# Patient Record
Sex: FEMALE | Race: BLACK OR AFRICAN AMERICAN | NOT HISPANIC OR LATINO | URBAN - METROPOLITAN AREA
[De-identification: names, ages, dates, MRNs, and addresses within clinical notes are randomized per-mention and may not be internally consistent; named-entity substitution may affect disease eponyms.]

---

## 2019-12-26 ENCOUNTER — INPATIENT (INPATIENT)
Facility: HOSPITAL | Age: 64
LOS: 4 days | Discharge: HOME | DRG: 684 | End: 2019-12-31
Attending: INTERNAL MEDICINE | Admitting: INTERNAL MEDICINE
Payer: MEDICARE

## 2019-12-26 VITALS
WEIGHT: 199.96 LBS | HEART RATE: 79 BPM | TEMPERATURE: 98 F | HEIGHT: 68 IN | RESPIRATION RATE: 18 BRPM | DIASTOLIC BLOOD PRESSURE: 76 MMHG | OXYGEN SATURATION: 98 % | SYSTOLIC BLOOD PRESSURE: 109 MMHG

## 2019-12-26 RX ORDER — KETOROLAC TROMETHAMINE 30 MG/ML
30 SYRINGE (ML) INJECTION ONCE
Refills: 0 | Status: DISCONTINUED | OUTPATIENT
Start: 2019-12-26 | End: 2019-12-26

## 2019-12-26 RX ORDER — SODIUM CHLORIDE 9 MG/ML
1000 INJECTION INTRAMUSCULAR; INTRAVENOUS; SUBCUTANEOUS ONCE
Refills: 0 | Status: COMPLETED | OUTPATIENT
Start: 2019-12-26 | End: 2019-12-26

## 2019-12-26 NOTE — ED ADULT TRIAGE NOTE - CHIEF COMPLAINT QUOTE
biba for fall at 5pm unable to get up with right ankle/foot swelling, pt denies hitting head, as per pt from bed went to the bathroom and while pt was trying to get up right leg just gave out

## 2019-12-27 DIAGNOSIS — E78.5 HYPERLIPIDEMIA, UNSPECIFIED: ICD-10-CM

## 2019-12-27 DIAGNOSIS — N17.9 ACUTE KIDNEY FAILURE, UNSPECIFIED: ICD-10-CM

## 2019-12-27 DIAGNOSIS — Z71.89 OTHER SPECIFIED COUNSELING: ICD-10-CM

## 2019-12-27 DIAGNOSIS — E11.9 TYPE 2 DIABETES MELLITUS WITHOUT COMPLICATIONS: ICD-10-CM

## 2019-12-27 DIAGNOSIS — I10 ESSENTIAL (PRIMARY) HYPERTENSION: ICD-10-CM

## 2019-12-27 DIAGNOSIS — E87.6 HYPOKALEMIA: ICD-10-CM
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
## 2019-12-27 DIAGNOSIS — F32.9 MAJOR DEPRESSIVE DISORDER, SINGLE EPISODE, UNSPECIFIED: ICD-10-CM

## 2019-12-27 DIAGNOSIS — Z29.9 ENCOUNTER FOR PROPHYLACTIC MEASURES, UNSPECIFIED: ICD-10-CM

## 2019-12-27 LAB
24R-OH-CALCIDIOL SERPL-MCNC: 26.1 NG/ML — LOW (ref 30–80)
ALBUMIN SERPL ELPH-MCNC: 3.2 G/DL — LOW (ref 3.5–5)
ALBUMIN SERPL ELPH-MCNC: 3.2 G/DL — LOW (ref 3.5–5)
ALP SERPL-CCNC: 84 U/L — SIGNIFICANT CHANGE UP (ref 40–120)
ALP SERPL-CCNC: 88 U/L — SIGNIFICANT CHANGE UP (ref 40–120)
ALT FLD-CCNC: 14 U/L DA — SIGNIFICANT CHANGE UP (ref 10–60)
ALT FLD-CCNC: 16 U/L DA — SIGNIFICANT CHANGE UP (ref 10–60)
ANION GAP SERPL CALC-SCNC: 8 MMOL/L — SIGNIFICANT CHANGE UP (ref 5–17)
ANION GAP SERPL CALC-SCNC: 9 MMOL/L — SIGNIFICANT CHANGE UP (ref 5–17)
ANION GAP SERPL CALC-SCNC: 9 MMOL/L — SIGNIFICANT CHANGE UP (ref 5–17)
APPEARANCE UR: ABNORMAL
APTT BLD: 34.7 SEC — SIGNIFICANT CHANGE UP (ref 27.5–36.3)
AST SERPL-CCNC: 12 U/L — SIGNIFICANT CHANGE UP (ref 10–40)
AST SERPL-CCNC: 15 U/L — SIGNIFICANT CHANGE UP (ref 10–40)
BASOPHILS # BLD AUTO: 0.03 K/UL — SIGNIFICANT CHANGE UP (ref 0–0.2)
BASOPHILS # BLD AUTO: 0.03 K/UL — SIGNIFICANT CHANGE UP (ref 0–0.2)
BASOPHILS NFR BLD AUTO: 0.3 % — SIGNIFICANT CHANGE UP (ref 0–2)
BASOPHILS NFR BLD AUTO: 0.4 % — SIGNIFICANT CHANGE UP (ref 0–2)
BILIRUB SERPL-MCNC: 0.4 MG/DL — SIGNIFICANT CHANGE UP (ref 0.2–1.2)
BILIRUB SERPL-MCNC: 0.7 MG/DL — SIGNIFICANT CHANGE UP (ref 0.2–1.2)
BILIRUB UR-MCNC: NEGATIVE — SIGNIFICANT CHANGE UP
BUN SERPL-MCNC: 49 MG/DL — HIGH (ref 7–18)
BUN SERPL-MCNC: 50 MG/DL — HIGH (ref 7–18)
BUN SERPL-MCNC: 53 MG/DL — HIGH (ref 7–18)
CALCIUM SERPL-MCNC: 8.6 MG/DL — SIGNIFICANT CHANGE UP (ref 8.4–10.5)
CALCIUM SERPL-MCNC: 8.7 MG/DL — SIGNIFICANT CHANGE UP (ref 8.4–10.5)
CALCIUM SERPL-MCNC: 9.1 MG/DL — SIGNIFICANT CHANGE UP (ref 8.4–10.5)
CHLORIDE SERPL-SCNC: 101 MMOL/L — SIGNIFICANT CHANGE UP (ref 96–108)
CHLORIDE SERPL-SCNC: 102 MMOL/L — SIGNIFICANT CHANGE UP (ref 96–108)
CHLORIDE SERPL-SCNC: 109 MMOL/L — HIGH (ref 96–108)
CHLORIDE UR-SCNC: 56 MMOL/L — SIGNIFICANT CHANGE UP (ref 55–125)
CHOLEST SERPL-MCNC: 197 MG/DL — SIGNIFICANT CHANGE UP (ref 10–199)
CK SERPL-CCNC: 126 U/L — SIGNIFICANT CHANGE UP (ref 21–215)
CO2 SERPL-SCNC: 26 MMOL/L — SIGNIFICANT CHANGE UP (ref 22–31)
CO2 SERPL-SCNC: 28 MMOL/L — SIGNIFICANT CHANGE UP (ref 22–31)
CO2 SERPL-SCNC: 29 MMOL/L — SIGNIFICANT CHANGE UP (ref 22–31)
COLOR SPEC: YELLOW — SIGNIFICANT CHANGE UP
CREAT SERPL-MCNC: 2.55 MG/DL — HIGH (ref 0.5–1.3)
CREAT SERPL-MCNC: 2.82 MG/DL — HIGH (ref 0.5–1.3)
CREAT SERPL-MCNC: 3.02 MG/DL — HIGH (ref 0.5–1.3)
DIFF PNL FLD: ABNORMAL
EOSINOPHIL # BLD AUTO: 0.02 K/UL — SIGNIFICANT CHANGE UP (ref 0–0.5)
EOSINOPHIL # BLD AUTO: 0.13 K/UL — SIGNIFICANT CHANGE UP (ref 0–0.5)
EOSINOPHIL NFR BLD AUTO: 0.2 % — SIGNIFICANT CHANGE UP (ref 0–6)
EOSINOPHIL NFR BLD AUTO: 1.5 % — SIGNIFICANT CHANGE UP (ref 0–6)
FOLATE SERPL-MCNC: 8.1 NG/ML — SIGNIFICANT CHANGE UP
GLUCOSE BLDC GLUCOMTR-MCNC: 112 MG/DL — HIGH (ref 70–99)
GLUCOSE BLDC GLUCOMTR-MCNC: 132 MG/DL — HIGH (ref 70–99)
GLUCOSE BLDC GLUCOMTR-MCNC: 134 MG/DL — HIGH (ref 70–99)
GLUCOSE BLDC GLUCOMTR-MCNC: 159 MG/DL — HIGH (ref 70–99)
GLUCOSE SERPL-MCNC: 174 MG/DL — HIGH (ref 70–99)
GLUCOSE SERPL-MCNC: 200 MG/DL — HIGH (ref 70–99)
GLUCOSE SERPL-MCNC: 99 MG/DL — SIGNIFICANT CHANGE UP (ref 70–99)
GLUCOSE UR QL: NEGATIVE — SIGNIFICANT CHANGE UP
HBA1C BLD-MCNC: 6.4 % — HIGH (ref 4–5.6)
HCT VFR BLD CALC: 27.4 % — LOW (ref 34.5–45)
HCT VFR BLD CALC: 28.9 % — LOW (ref 34.5–45)
HDLC SERPL-MCNC: 75 MG/DL — SIGNIFICANT CHANGE UP
HGB BLD-MCNC: 8.8 G/DL — LOW (ref 11.5–15.5)
HGB BLD-MCNC: 9.1 G/DL — LOW (ref 11.5–15.5)
IMM GRANULOCYTES NFR BLD AUTO: 0.4 % — SIGNIFICANT CHANGE UP (ref 0–1.5)
IMM GRANULOCYTES NFR BLD AUTO: 0.4 % — SIGNIFICANT CHANGE UP (ref 0–1.5)
INR BLD: 1.05 RATIO — SIGNIFICANT CHANGE UP (ref 0.88–1.16)
KETONES UR-MCNC: NEGATIVE — SIGNIFICANT CHANGE UP
LEUKOCYTE ESTERASE UR-ACNC: ABNORMAL
LIPID PNL WITH DIRECT LDL SERPL: 110 MG/DL — SIGNIFICANT CHANGE UP
LYMPHOCYTES # BLD AUTO: 0.97 K/UL — LOW (ref 1–3.3)
LYMPHOCYTES # BLD AUTO: 1.04 K/UL — SIGNIFICANT CHANGE UP (ref 1–3.3)
LYMPHOCYTES # BLD AUTO: 12.3 % — LOW (ref 13–44)
LYMPHOCYTES # BLD AUTO: 8.4 % — LOW (ref 13–44)
MAGNESIUM SERPL-MCNC: 2.1 MG/DL — SIGNIFICANT CHANGE UP (ref 1.6–2.6)
MCHC RBC-ENTMCNC: 28.8 PG — SIGNIFICANT CHANGE UP (ref 27–34)
MCHC RBC-ENTMCNC: 29.3 PG — SIGNIFICANT CHANGE UP (ref 27–34)
MCHC RBC-ENTMCNC: 31.5 GM/DL — LOW (ref 32–36)
MCHC RBC-ENTMCNC: 32.1 GM/DL — SIGNIFICANT CHANGE UP (ref 32–36)
MCV RBC AUTO: 91.3 FL — SIGNIFICANT CHANGE UP (ref 80–100)
MCV RBC AUTO: 91.5 FL — SIGNIFICANT CHANGE UP (ref 80–100)
MONOCYTES # BLD AUTO: 0.75 K/UL — SIGNIFICANT CHANGE UP (ref 0–0.9)
MONOCYTES # BLD AUTO: 0.83 K/UL — SIGNIFICANT CHANGE UP (ref 0–0.9)
MONOCYTES NFR BLD AUTO: 7.2 % — SIGNIFICANT CHANGE UP (ref 2–14)
MONOCYTES NFR BLD AUTO: 8.9 % — SIGNIFICANT CHANGE UP (ref 2–14)
NEUTROPHILS # BLD AUTO: 6.48 K/UL — SIGNIFICANT CHANGE UP (ref 1.8–7.4)
NEUTROPHILS # BLD AUTO: 9.63 K/UL — HIGH (ref 1.8–7.4)
NEUTROPHILS NFR BLD AUTO: 76.5 % — SIGNIFICANT CHANGE UP (ref 43–77)
NEUTROPHILS NFR BLD AUTO: 83.5 % — HIGH (ref 43–77)
NITRITE UR-MCNC: NEGATIVE — SIGNIFICANT CHANGE UP
NRBC # BLD: 0 /100 WBCS — SIGNIFICANT CHANGE UP (ref 0–0)
NRBC # BLD: 0 /100 WBCS — SIGNIFICANT CHANGE UP (ref 0–0)
NT-PROBNP SERPL-SCNC: 194 PG/ML — HIGH (ref 0–125)
OSMOLALITY UR: 271 MOS/KG — SIGNIFICANT CHANGE UP (ref 50–1200)
PH UR: 6.5 — SIGNIFICANT CHANGE UP (ref 5–8)
PHOSPHATE SERPL-MCNC: 3.8 MG/DL — SIGNIFICANT CHANGE UP (ref 2.5–4.5)
PLATELET # BLD AUTO: 278 K/UL — SIGNIFICANT CHANGE UP (ref 150–400)
PLATELET # BLD AUTO: 291 K/UL — SIGNIFICANT CHANGE UP (ref 150–400)
POTASSIUM SERPL-MCNC: 2.8 MMOL/L — CRITICAL LOW (ref 3.5–5.3)
POTASSIUM SERPL-MCNC: 3.1 MMOL/L — LOW (ref 3.5–5.3)
POTASSIUM SERPL-MCNC: 3.6 MMOL/L — SIGNIFICANT CHANGE UP (ref 3.5–5.3)
POTASSIUM SERPL-SCNC: 2.8 MMOL/L — CRITICAL LOW (ref 3.5–5.3)
POTASSIUM SERPL-SCNC: 3.1 MMOL/L — LOW (ref 3.5–5.3)
POTASSIUM SERPL-SCNC: 3.6 MMOL/L — SIGNIFICANT CHANGE UP (ref 3.5–5.3)
PROT ?TM UR-MCNC: 14 MG/DL — HIGH (ref 0–12)
PROT SERPL-MCNC: 7.5 G/DL — SIGNIFICANT CHANGE UP (ref 6–8.3)
PROT SERPL-MCNC: 7.9 G/DL — SIGNIFICANT CHANGE UP (ref 6–8.3)
PROT UR-MCNC: 15
PROTHROM AB SERPL-ACNC: 11.7 SEC — SIGNIFICANT CHANGE UP (ref 10–12.9)
RBC # BLD: 3 M/UL — LOW (ref 3.8–5.2)
RBC # BLD: 3.16 M/UL — LOW (ref 3.8–5.2)
RBC # FLD: 14.7 % — HIGH (ref 10.3–14.5)
RBC # FLD: 14.9 % — HIGH (ref 10.3–14.5)
SODIUM SERPL-SCNC: 138 MMOL/L — SIGNIFICANT CHANGE UP (ref 135–145)
SODIUM SERPL-SCNC: 139 MMOL/L — SIGNIFICANT CHANGE UP (ref 135–145)
SODIUM SERPL-SCNC: 144 MMOL/L — SIGNIFICANT CHANGE UP (ref 135–145)
SODIUM UR-SCNC: 63 MMOL/L — SIGNIFICANT CHANGE UP (ref 40–220)
SP GR SPEC: 1.01 — SIGNIFICANT CHANGE UP (ref 1.01–1.02)
TOTAL CHOLESTEROL/HDL RATIO MEASUREMENT: 2.6 RATIO — LOW (ref 3.3–7.1)
TRIGL SERPL-MCNC: 58 MG/DL — SIGNIFICANT CHANGE UP (ref 10–149)
TROPONIN I SERPL-MCNC: <0.015 NG/ML — SIGNIFICANT CHANGE UP (ref 0–0.04)
TSH SERPL-MCNC: 2.96 UU/ML — SIGNIFICANT CHANGE UP (ref 0.34–4.82)
URATE UR-MCNC: 19 MG/DL — SIGNIFICANT CHANGE UP
UROBILINOGEN FLD QL: NEGATIVE — SIGNIFICANT CHANGE UP
VIT B12 SERPL-MCNC: 368 PG/ML — SIGNIFICANT CHANGE UP (ref 232–1245)
WBC # BLD: 11.53 K/UL — HIGH (ref 3.8–10.5)
WBC # BLD: 8.46 K/UL — SIGNIFICANT CHANGE UP (ref 3.8–10.5)
WBC # FLD AUTO: 11.53 K/UL — HIGH (ref 3.8–10.5)
WBC # FLD AUTO: 8.46 K/UL — SIGNIFICANT CHANGE UP (ref 3.8–10.5)

## 2019-12-27 PROCEDURE — 73630 X-RAY EXAM OF FOOT: CPT | Mod: 26,RT

## 2019-12-27 PROCEDURE — 99285 EMERGENCY DEPT VISIT HI MDM: CPT

## 2019-12-27 PROCEDURE — 73610 X-RAY EXAM OF ANKLE: CPT | Mod: 26,RT

## 2019-12-27 PROCEDURE — 71045 X-RAY EXAM CHEST 1 VIEW: CPT | Mod: 26

## 2019-12-27 PROCEDURE — 99053 MED SERV 10PM-8AM 24 HR FAC: CPT

## 2019-12-27 PROCEDURE — 70450 CT HEAD/BRAIN W/O DYE: CPT | Mod: 26

## 2019-12-27 RX ORDER — OLANZAPINE 15 MG/1
10 TABLET, FILM COATED ORAL DAILY
Refills: 0 | Status: DISCONTINUED | OUTPATIENT
Start: 2019-12-27 | End: 2019-12-31

## 2019-12-27 RX ORDER — ONDANSETRON 8 MG/1
8 TABLET, FILM COATED ORAL THREE TIMES A DAY
Refills: 0 | Status: DISCONTINUED | OUTPATIENT
Start: 2019-12-27 | End: 2019-12-31

## 2019-12-27 RX ORDER — POTASSIUM CHLORIDE 20 MEQ
40 PACKET (EA) ORAL EVERY 4 HOURS
Refills: 0 | Status: DISCONTINUED | OUTPATIENT
Start: 2019-12-27 | End: 2019-12-27

## 2019-12-27 RX ORDER — SIMVASTATIN 20 MG/1
1 TABLET, FILM COATED ORAL
Qty: 0 | Refills: 0 | DISCHARGE

## 2019-12-27 RX ORDER — CARVEDILOL PHOSPHATE 80 MG/1
12.5 CAPSULE, EXTENDED RELEASE ORAL EVERY 12 HOURS
Refills: 0 | Status: DISCONTINUED | OUTPATIENT
Start: 2019-12-27 | End: 2019-12-31

## 2019-12-27 RX ORDER — DULOXETINE HYDROCHLORIDE 30 MG/1
60 CAPSULE, DELAYED RELEASE ORAL DAILY
Refills: 0 | Status: DISCONTINUED | OUTPATIENT
Start: 2019-12-27 | End: 2019-12-31

## 2019-12-27 RX ORDER — POTASSIUM CHLORIDE 20 MEQ
20 PACKET (EA) ORAL ONCE
Refills: 0 | Status: COMPLETED | OUTPATIENT
Start: 2019-12-27 | End: 2019-12-27

## 2019-12-27 RX ORDER — DULOXETINE HYDROCHLORIDE 30 MG/1
1 CAPSULE, DELAYED RELEASE ORAL
Qty: 0 | Refills: 0 | DISCHARGE

## 2019-12-27 RX ORDER — FLUOXETINE HCL 10 MG
20 CAPSULE ORAL AT BEDTIME
Refills: 0 | Status: DISCONTINUED | OUTPATIENT
Start: 2019-12-27 | End: 2019-12-31

## 2019-12-27 RX ORDER — TRAZODONE HCL 50 MG
50 TABLET ORAL DAILY
Refills: 0 | Status: DISCONTINUED | OUTPATIENT
Start: 2019-12-27 | End: 2019-12-31

## 2019-12-27 RX ORDER — ONDANSETRON 8 MG/1
1 TABLET, FILM COATED ORAL
Qty: 0 | Refills: 0 | DISCHARGE

## 2019-12-27 RX ORDER — FLUOXETINE HCL 10 MG
1 CAPSULE ORAL
Qty: 0 | Refills: 0 | DISCHARGE

## 2019-12-27 RX ORDER — INSULIN LISPRO 100/ML
VIAL (ML) SUBCUTANEOUS
Refills: 0 | Status: DISCONTINUED | OUTPATIENT
Start: 2019-12-27 | End: 2019-12-31

## 2019-12-27 RX ORDER — OLANZAPINE 15 MG/1
1 TABLET, FILM COATED ORAL
Qty: 0 | Refills: 0 | DISCHARGE

## 2019-12-27 RX ORDER — AMLODIPINE BESYLATE 2.5 MG/1
1 TABLET ORAL
Qty: 0 | Refills: 0 | DISCHARGE

## 2019-12-27 RX ORDER — TRAZODONE HCL 50 MG
0 TABLET ORAL
Qty: 0 | Refills: 0 | DISCHARGE

## 2019-12-27 RX ORDER — SIMVASTATIN 20 MG/1
20 TABLET, FILM COATED ORAL AT BEDTIME
Refills: 0 | Status: DISCONTINUED | OUTPATIENT
Start: 2019-12-27 | End: 2019-12-31

## 2019-12-27 RX ORDER — SODIUM CHLORIDE 9 MG/ML
1000 INJECTION INTRAMUSCULAR; INTRAVENOUS; SUBCUTANEOUS
Refills: 0 | Status: DISCONTINUED | OUTPATIENT
Start: 2019-12-27 | End: 2019-12-27

## 2019-12-27 RX ORDER — SODIUM CHLORIDE 9 MG/ML
1000 INJECTION INTRAMUSCULAR; INTRAVENOUS; SUBCUTANEOUS
Refills: 0 | Status: DISCONTINUED | OUTPATIENT
Start: 2019-12-27 | End: 2019-12-31

## 2019-12-27 RX ORDER — CARVEDILOL PHOSPHATE 80 MG/1
1 CAPSULE, EXTENDED RELEASE ORAL
Qty: 0 | Refills: 0 | DISCHARGE

## 2019-12-27 RX ORDER — ENOXAPARIN SODIUM 100 MG/ML
30 INJECTION SUBCUTANEOUS DAILY
Refills: 0 | Status: DISCONTINUED | OUTPATIENT
Start: 2019-12-27 | End: 2019-12-31

## 2019-12-27 RX ORDER — AMLODIPINE BESYLATE 2.5 MG/1
10 TABLET ORAL DAILY
Refills: 0 | Status: DISCONTINUED | OUTPATIENT
Start: 2019-12-27 | End: 2019-12-31

## 2019-12-27 RX ADMIN — SIMVASTATIN 20 MILLIGRAM(S): 20 TABLET, FILM COATED ORAL at 21:40

## 2019-12-27 RX ADMIN — ENOXAPARIN SODIUM 30 MILLIGRAM(S): 100 INJECTION SUBCUTANEOUS at 12:23

## 2019-12-27 RX ADMIN — Medication 40 MILLIEQUIVALENT(S): at 12:23

## 2019-12-27 RX ADMIN — Medication 20 MILLIEQUIVALENT(S): at 05:47

## 2019-12-27 RX ADMIN — Medication 20 MILLIGRAM(S): at 21:40

## 2019-12-27 RX ADMIN — Medication 1: at 21:40

## 2019-12-27 RX ADMIN — DULOXETINE HYDROCHLORIDE 60 MILLIGRAM(S): 30 CAPSULE, DELAYED RELEASE ORAL at 12:23

## 2019-12-27 RX ADMIN — Medication 30 MILLIGRAM(S): at 03:09

## 2019-12-27 RX ADMIN — AMLODIPINE BESYLATE 10 MILLIGRAM(S): 2.5 TABLET ORAL at 05:47

## 2019-12-27 RX ADMIN — SODIUM CHLORIDE 70 MILLILITER(S): 9 INJECTION INTRAMUSCULAR; INTRAVENOUS; SUBCUTANEOUS at 05:47

## 2019-12-27 RX ADMIN — Medication 20 MILLIEQUIVALENT(S): at 15:22

## 2019-12-27 RX ADMIN — SODIUM CHLORIDE 1000 MILLILITER(S): 9 INJECTION INTRAMUSCULAR; INTRAVENOUS; SUBCUTANEOUS at 03:10

## 2019-12-27 RX ADMIN — OLANZAPINE 10 MILLIGRAM(S): 15 TABLET, FILM COATED ORAL at 12:23

## 2019-12-27 RX ADMIN — CARVEDILOL PHOSPHATE 12.5 MILLIGRAM(S): 80 CAPSULE, EXTENDED RELEASE ORAL at 05:47

## 2019-12-27 RX ADMIN — CARVEDILOL PHOSPHATE 12.5 MILLIGRAM(S): 80 CAPSULE, EXTENDED RELEASE ORAL at 17:13

## 2019-12-27 RX ADMIN — SODIUM CHLORIDE 1000 MILLILITER(S): 9 INJECTION INTRAMUSCULAR; INTRAVENOUS; SUBCUTANEOUS at 03:08

## 2019-12-27 RX ADMIN — Medication 50 MILLIGRAM(S): at 12:23

## 2019-12-27 NOTE — H&P ADULT - PROBLEM SELECTOR PLAN 9
Goals of care discussed with patient   meaning of CPR described in detail and wants everything to be done after understanding risk associated with age.  Pt is FULL CODE.

## 2019-12-27 NOTE — H&P ADULT - NSHPPHYSICALEXAM_GEN_ALL_CORE
Vital Signs Last 24 Hrs  T(C): 36.6 (26 Dec 2019 22:27), Max: 36.6 (26 Dec 2019 22:27)  T(F): 97.8 (26 Dec 2019 22:27), Max: 97.8 (26 Dec 2019 22:27)  HR: 79 (26 Dec 2019 22:27) (79 - 79)  BP: 109/76 (26 Dec 2019 22:27) (109/76 - 109/76)  RR: 18 (26 Dec 2019 22:27) (18 - 18)  SpO2: 98% (26 Dec 2019 22:27) (98% - 98%)    PHYSICAL EXAM:   · Physical Examination: No signs of trauma  · CONSTITUTIONAL: Well appearing, awake, alert, oriented to person, place, time/situation and in no apparent distress.  · ENMT: Airway patent, Nasal mucosa clear. Mouth with normal mucosa. Throat has no vesicles, no oropharyngeal exudates and uvula is midline.  · EYES: Clear bilaterally, pupils equal, round and reactive to light.  · CARDIAC: Normal rate, regular rhythm.  Heart sounds S1, S2.  No murmurs, rubs or gallops.  · RESPIRATORY: Breath sounds clear and equal bilaterally.  · GASTROINTESTINAL: Abdomen soft, non-tender, no guarding.  · MSUC EXT EXAM: right lower extremity findings  · Right Lower Location: ankle  · Right Lower Extremity: LIMITED ROM, TENDERNESS, no swelling, no warmth  · MUSC VASC: no vascular compromise  · NEUROLOGICAL: Alert and oriented, no focal deficits, no motor or sensory deficits.  · SKIN: Skin normal color for race, warm, dry and intact. No evidence of rash.

## 2019-12-27 NOTE — CONSULT NOTE ADULT - SUBJECTIVE AND OBJECTIVE BOX
HPI:  64 year old female from home lives with daughter ambulates independently with a significant PMHx of DM, hypertension , HLD , Depression , breast cancer (s/p chemo 4 months ago , last chemo x3 weeks ago) and PSHx of Thyroidectomy presents to the ED s/p fall at 5pm today. Patient relates that she was walking to the bathroom when her right lower extremity got weak and gave out then she fell on her buttocks . Patient reports she has intermittent right ankle pain but today it got weak causing the fall. Patient states she was unable to get up and her sister contacted EMS after discovering her in the bathroom. Patient relates her right lower extremity is feeling baseline at this point but still c/o of right ankle pain. Pt had multiple falls in the past . Patient denies any head trauma, LOC, or any other complaints. Patient denies any nausea, vomiting, abdominal pain, back pain, numbness, tingling, fever, headache or any other complaints. Denies smoking, alcohol, illicit drug use. NKDA .   There is a history of impaired kidney function at 40%. Denies blood in urine or difficulty passing urine.    PMH:   as above    PSH:   as above    FAMILY HISTORY: No kidney disease    Social History:  non-smoker/ non-alcoholic     Home Meds:  MEDICATIONS  (STANDING):  amLODIPine   Tablet 10 milliGRAM(s) Oral daily  carvedilol 12.5 milliGRAM(s) Oral every 12 hours  DULoxetine 60 milliGRAM(s) Oral daily  enoxaparin Injectable 30 milliGRAM(s) SubCutaneous daily  FLUoxetine 20 milliGRAM(s) Oral at bedtime  insulin lispro (HumaLOG) corrective regimen sliding scale   SubCutaneous Before meals and at bedtime  OLANZapine 10 milliGRAM(s) Oral daily  simvastatin 20 milliGRAM(s) Oral at bedtime  sodium chloride 0.9%. 1000 milliLiter(s) (70 mL/Hr) IV Continuous <Continuous>  traZODone 50 milliGRAM(s) Oral daily    MEDICATIONS  (PRN):  ondansetron    Tablet 8 milliGRAM(s) Oral three times a day PRN Nausea and/or Vomiting      Allergies:  No Known Allergies    REVIEW OF SYSTEMS:    CONSTITUTIONAL: No fever or chills  EYES: No eye pain or discharge  ENMT:  No throat pain  NECK: No pain or stiffness  RESPIRATORY: No cough. No shortness of breath  CARDIOVASCULAR: No chest pain, palpitations or leg swelling  GASTROINTESTINAL: No abdominal or epigastric pain. No nausea, vomiting, or diarrhea  GENITOURINARY: No dysuria, frequency, hematuria, or incontinence  NEUROLOGICAL: No headaches  SKIN: No itching, burning, rashes    Vital Signs Last 24 Hrs  T(C): 36.3 (27 Dec 2019 15:54), Max: 37 (27 Dec 2019 04:52)  T(F): 97.4 (27 Dec 2019 15:54), Max: 98.6 (27 Dec 2019 04:52)  HR: 73 (27 Dec 2019 15:54) (64 - 86)  BP: 147/74 (27 Dec 2019 15:54) (109/76 - 167/81)  BP(mean): --  RR: 16 (27 Dec 2019 15:54) (16 - 18)  SpO2: 100% (27 Dec 2019 15:54) (96% - 100%)    PHYSICAL EXAM:    GENERAL: NAD, well-nourished, well-developed  HEAD:  Atraumatic, Normocephalic  EYES: Sclera anicteric  ENMT: Moist mucous membranes  NECK: Supple, No JVD  NERVOUS SYSTEM:  Alert & Oriented X3  CHEST/LUNG: Clear to auscultation  HEART: Regular rate and rhythm; No murmurs  ABDOMEN: Soft, Nontender, Nondistended; Bowel sounds present  EXTREMITIES:  No edema  SKIN: Normal turgor    LABS:                        9.1    8.46  )-----------( 291      ( 27 Dec 2019 10:14 )             28.9         139  |  102  |  49<H>  ----------------------------<  99  2.8<LL>   |  29  |  2.55<H>    Ca    9.1      27 Dec 2019 10:14  Phos  3.8       Mg     2.1         TPro  7.9  /  Alb  3.2<L>  /  TBili  0.7  /  DBili  x   /  AST  12  /  ALT  16  /  AlkPhos  84      PT/INR - ( 27 Dec 2019 02:14 )   PT: 11.7 sec;   INR: 1.05 ratio         PTT - ( 27 Dec 2019 02:14 )  PTT:34.7 sec  Urinalysis Basic - ( 27 Dec 2019 04:22 )    Color: Yellow / Appearance: Slightly Turbid / S.010 / pH: x  Gluc: x / Ketone: Negative  / Bili: Negative / Urobili: Negative   Blood: x / Protein: 15 / Nitrite: Negative   Leuk Esterase: Trace / RBC: 10-25 /HPF / WBC 0-2 /HPF   Sq Epi: x / Non Sq Epi: Few /HPF / Bacteria: Many /HPF      Vitamin D, 25-Hydroxy: 26.1 ng/mL ( @ 14:11)  Magnesium, Serum: 2.1 mg/dL ( @ 10:14)  Phosphorus Level, Serum: 3.8 mg/dL ( @ 10:14)      ASSESSMENT AND PLAN:  1. YASMINE secondary to volume depletion  2. R/O CKD, DM/HTN related kidney disease  3. Hypokalemia is depletional  4. HTN is controlled  Continue current IVF  Supplement K  Keep patient euvolemic and renal diet  Avoid Nephrtoxic Meds/ Agents such as NSAIDs, Gadolinium contrast, Phosphate containing enemas, etc..)  Adjust Medications according to eGFR  Obtain spot urine for protein and creatinine, Renal US  Follow BMP, H/H  Many thanks

## 2019-12-27 NOTE — PHYSICAL THERAPY INITIAL EVALUATION ADULT - GAIT DISTANCE, PT EVAL
4 small steps-unable to ambulate further due to inability to maintain NWB prec of RLE (pending clearance)

## 2019-12-27 NOTE — H&P ADULT - ASSESSMENT
64 year old female from home lives with daughter ambulates independently with a significant PMHx of DM, hypertension , HLD , Depression , breast cancer (s/p chemo 4 months ago , last chemo x3 weeks ago) and PSHx of Thyroidectomy presents to the ED s/p fall at 5pm today. Patient relates that she was walking to the bathroom when her right lower extremity got weak and gave out then she fell on her buttocks . Patient reports she has intermittent right ankle pain but today it got weak causing the fall. Patient states she was unable to get up and her sister contacted EMS after discovering her in the bathroom. Patient relates her right lower extremity is feeling baseline at this point but still c/o of right ankle pain. Pt had multiple falls in the past . Patient denies any head trauma, LOC, or any other complaints. Patient denies any nausea, vomiting, abdominal pain, back pain, numbness, tingling, fever, headache or any other complaints. Denies smoking, alcohol, illicit drug use. NKDA .     In ED :   labs were significant for elevated creatinine- pt states was told in past her kidney was at 40%.   CT head : WNL.   x-ray ankle : no obvious fx    Pt has oncologist in [place] Dr. Kay  (Morning team to get the number from sister and contact him regarding pt Chemo current regimen   ****Morning team to contact patients sister for home med rec and to get the number of her oncologist in [place] Dr. Kay  **** Reconciled the medications that ED Attending added for now     Pt is FULL CODE.     Pt is admitted for management of YASMINE

## 2019-12-27 NOTE — H&P ADULT - PROBLEM SELECTOR PLAN 8
Goals of care discussed with patient   meaning of CPR described in detail and wants everything to be done after understanding risk associated with age.  Pt is FULL CODE. IMPROVE VTE Individual Risk Assessment    RISK                                                                Points    [  ] Previous VTE                                                  3    [  ] Thrombophilia                                               2    [  ] Lower limb paralysis                                      2        (unable to hold up >15 seconds)    [  ] Current Cancer                                              2         (within 6 months)  [X] Immobilization > 24 hrs                                1  [  ] ICU/CCU stay > 24 hours                              1  [X] Age > 60                                                      1    IMPROVE VTE Score _____2____  c/w Lovenox 30 mg qd (renally dosed)   no need for GI ppx.

## 2019-12-27 NOTE — H&P ADULT - PROBLEM SELECTOR PLAN 3
- K is 3.1 on admission,   - likely secondary to poor PO intake  - replacing KCl 20mg x 1 (given YASMINE)  - Continue to monitor electrolytes.  - f/u BMP

## 2019-12-27 NOTE — H&P ADULT - PROBLEM SELECTOR PLAN 7
IMPROVE VTE Individual Risk Assessment    RISK                                                                Points    [  ] Previous VTE                                                  3    [  ] Thrombophilia                                               2    [  ] Lower limb paralysis                                      2        (unable to hold up >15 seconds)    [  ] Current Cancer                                              2         (within 6 months)  [X] Immobilization > 24 hrs                                1  [  ] ICU/CCU stay > 24 hours                              1  [X] Age > 60                                                      1    IMPROVE VTE Score _____2____  c/w Lovenox 30 mg qd (renally dosed)   no need for GI ppx. - hx of depression  - c/w Duloxetine , fluoxetine and trazodone

## 2019-12-27 NOTE — CHART NOTE - NSCHARTNOTEFT_GEN_A_CORE
called patients sister, confirmed medication, all medications in outpatient medication review are verified. called Dr price office ( [phone]) her oncologist,, she completed her 4th cycle of chemotherapy  in november 2019.   she is not currently on any active chemoregimen. thanks called patients sister, confirmed medication, all medications in outpatient medication review are verified. called Dr price office ( [phone]) her oncologist,, she completed her 4th cycle of chemotherapy  in november 2019.   she is not currently on any active chemoregimen.   Dr Amaral consulted for YASMINE. called patients sister, confirmed medication, all medications in outpatient medication review are verified. called Dr price office ( [phone]) her oncologist,, she completed her 4th cycle of chemotherapy  in november 2019.   she is not currently on any active chemoregimen.   Dr Amaral consulted for YASMINE.    Called patient s PCP Dr Mabel Hurt office, her last BMP was done on november 19th , BUN/CR was 37/2.58 called patients sister, confirmed medication, all medications in outpatient medication review are verified. called Dr price office ( [phone]) her oncologist,, she completed her 4th cycle of chemotherapy  in november 2019.   she is not currently on any active chemoregimen.   Dr Amaral consulted for YASMINE.    Called patient s PCP Dr Mabel Hurt office, her last BMP was done on november 19th ,2019 BUN/CR was 37/2.58

## 2019-12-27 NOTE — H&P ADULT - PROBLEM SELECTOR PLAN 5
- Patient takes Glipizide-Metformin at home  - will hold home medications  - will start sliding scale  - Monitor blood sugars AC/HS and adjust as needed  - goal -180.   - f/u HgA1c  - Carbohydrate consistent diabetic diet with evening snacks.

## 2019-12-27 NOTE — H&P ADULT - PROBLEM SELECTOR PLAN 1
Lasix , HCTZ , Lisinopril - p/w BUN/Cr 53/3.02   - unknown baseline  - UA -ve  - K 3.1    - pt was taking Lasix , HCTZ and Lisinopril  - will hold Lasix , HCTZ and Lisinopril given YASMINE   - started IV fluids  - Monitor renal function , intake and output   - f/u BMP , Cr urine , Pr/Cr ratio , Urine lytes , Sodium urine , total protein urine , FeNa  - f/u Renal and bladder US to assess kidney size and r/o hydronephrosis.  - low K , low PO4 Diet

## 2019-12-27 NOTE — H&P ADULT - PROBLEM SELECTOR PLAN 4
- Pt taking Lasix , HCTZ , Lisinopril , Carvedilol and Amlodipine at home    - c/w Carvedilol and Amlodipine with parameters  - Monitor BP closely and adjust medications if clinically indicated.  - DASH diet.

## 2019-12-27 NOTE — ED PROVIDER NOTE - CLINICAL SUMMARY MEDICAL DECISION MAKING FREE TEXT BOX
64 year old female s/p fall. vitals WNL. PE as above.  labs are significant for elevated creatinine- pt states was told in past her kidney was at 40%. ct head WNL. xray ankle without obvious fx. will admit.

## 2019-12-27 NOTE — H&P ADULT - HISTORY OF PRESENT ILLNESS
64 year old female from home lives with daughter ambulates independently with a significant PMHx of DM, hypertension , HLD , Depression , breast cancer (s/p chemo 4 months ago , last chemo x3 weeks ago) and PSHx of Thyroidectomy presents to the ED s/p fall at 5pm today. Patient relates that she was walking to the bathroom when her right lower extremity got weak and gave out then she fell on her buttocks . Patient reports she has intermittent right ankle pain but today it got weak causing the fall. Patient states she was unable to get up and her sister contacted EMS after discovering her in the bathroom. Patient relates her right lower extremity is feeling baseline at this point but still c/o of right ankle pain. Pt had multiple falls in the past . Patient denies any head trauma, LOC, or any other complaints. Patient denies any nausea, vomiting, abdominal pain, back pain, numbness, tingling, fever, headache or any other complaints. Denies smoking, alcohol, illicit drug use. NKDA .     In ED :   labs were significant for elevated creatinine- pt states was told in past her kidney was at 40%.   CT head : WNL.   x-ray ankle : no obvious fx    Pt has oncologist in [place] Dr. Kay  (Morning team to get the number from sister and contact him regarding pt Chemo current regimen   ****Morning team to contact patients sister for home med rec and to get the number of her oncologist in [place] Dr. Kay  **** Reconciled the medications that ED Attending added for now     Pt is FULL CODE.

## 2019-12-27 NOTE — PHYSICAL THERAPY INITIAL EVALUATION ADULT - GENERAL OBSERVATIONS, REHAB EVAL
Pt seen supine in bed with IV, c/o (R)ankle pain (PS 5/10-RN aware), (+) swelling noted. Pt was cooperative during assessment

## 2019-12-27 NOTE — DISCHARGE NOTE PROVIDER - NSDCMRMEDTOKEN_GEN_ALL_CORE_FT
amLODIPine 10 mg oral tablet: 1 tab(s) orally once a day  carvedilol 12.5 mg oral tablet: 1 tab(s) orally 2 times a day  DULoxetine 60 mg oral delayed release capsule: 1 cap(s) orally once a day  FLUoxetine 20 mg oral tablet: 1 tab(s) orally once a day  furosemide 20 mg oral tablet:   glipizide-metformin:   hydroCHLOROthiazide 25 mg oral tablet: 1 tab(s) orally once a day  lisinopril 40 mg oral tablet: 1 tab(s) orally once a day  OLANZapine 10 mg oral tablet: 1 tab(s) orally once a day  ondansetron 8 mg oral tablet: 1 tab(s) orally 3 times a day  simvastatin 20 mg oral tablet: 1 tab(s) orally once a day (at bedtime)  traZODone 50 mg oral tablet: amLODIPine 10 mg oral tablet: 1 tab(s) orally once a day  carvedilol 12.5 mg oral tablet: 1 tab(s) orally 2 times a day  DULoxetine 60 mg oral delayed release capsule: 1 cap(s) orally once a day  FLUoxetine 20 mg oral tablet: 1 tab(s) orally once a day  furosemide 20 mg oral tablet:   glipizide-metformin:   hydroCHLOROthiazide 25 mg oral tablet: 1 tab(s) orally once a day  OLANZapine 10 mg oral tablet: 1 tab(s) orally once a day  ondansetron 8 mg oral tablet: 1 tab(s) orally 3 times a day  simvastatin 20 mg oral tablet: 1 tab(s) orally once a day (at bedtime)  traZODone 50 mg oral tablet:

## 2019-12-27 NOTE — DISCHARGE NOTE PROVIDER - NSDCCPCAREPLAN_GEN_ALL_CORE_FT
PRINCIPAL DISCHARGE DIAGNOSIS  Diagnosis: YASMINE (acute kidney injury)  Assessment and Plan of Treatment: Your Cr on admission was a=elevated, which improved after IV hydration. you are recommended to follow up with your PCP for further recommendations.      SECONDARY DISCHARGE DIAGNOSES  Diagnosis: HTN (hypertension)  Assessment and Plan of Treatment: Blood Pressure Control , Please continue current medication regimen, and follow up with your PCP  -LAsix and lisinopril were held because of concern for kidney injury  - You should continue on the current antihypertensive regimen regularly.  - You blood pressure should be within 140-120/80-90.  - You should follow-up with your PCP within 1 week of your discharge for routine blood pressure monitoring at your next visit.  - Notify your doctor if you have any of the following symptoms:   (Dizziness, Lightheadedness, Blurry vision, Headache, Chest pain, Shortness of breath.)  - You should maintain healthy lifestyle by eating healthy low salt diet, avoid fatty food, weight loss, exercise regularly as tolerated 30 mins X 3 time per week.      Diagnosis: Fall  Assessment and Plan of Treatment: You presented after a fall, X ray foot showed Possible stress fracture, fourth metatarsal of indeterminate age. Clinical correlation is suggested.  You were managed conservatively for fracture.      Diagnosis: Diabetes  Assessment and Plan of Treatment: Your HbA1c was 6.4 here, you are recommended to take your medications properly and follow up with PCP for further recommendations PRINCIPAL DISCHARGE DIAGNOSIS  Diagnosis: YASMINE (acute kidney injury)  Assessment and Plan of Treatment: Your Cr on admission was a=elevated, which improved after IV hydration. you are recommended to follow up with your PCP for further recommendations.      SECONDARY DISCHARGE DIAGNOSES  Diagnosis: Fall  Assessment and Plan of Treatment: You presented after a fall, X ray foot showed Possible stress fracture, fourth metatarsal of indeterminate age. Clinical correlation is suggested.  You were managed conservatively for fracture.      Diagnosis: Diabetes  Assessment and Plan of Treatment: Your HbA1c was 6.4 here, you are recommended to take your medications properly and follow up with PCP for further recommendations    Diagnosis: HTN (hypertension)  Assessment and Plan of Treatment: Blood Pressure Control , Please continue current medication regimen, and follow up with your PCP  -LAsix and lisinopril were held because of concern for kidney injury  - You should continue on the current antihypertensive regimen regularly.  - You blood pressure should be within 140-120/80-90.  - You should follow-up with your PCP within 1 week of your discharge for routine blood pressure monitoring at your next visit.  - Notify your doctor if you have any of the following symptoms:   (Dizziness, Lightheadedness, Blurry vision, Headache, Chest pain, Shortness of breath.)  - You should maintain healthy lifestyle by eating healthy low salt diet, avoid fatty food, weight loss, exercise regularly as tolerated 30 mins X 3 time per week.

## 2019-12-27 NOTE — PHYSICAL THERAPY INITIAL EVALUATION ADULT - PERTINENT HX OF CURRENT PROBLEM, REHAB EVAL
Pt admitted s/p fall at home with c/o right ankle pain-x-ray revealed stress fracture of (R) 4th metatarsal-indeterminate age, ligamentous injury of indeterminate age (R) ankle-Findings discussed w/MD-recommended Podiatrist consult and NWB prec of RLE until cleared

## 2019-12-27 NOTE — PHYSICAL THERAPY INITIAL EVALUATION ADULT - PLANNED THERAPY INTERVENTIONS, PT EVAL
transfer training/strengthening/balance training/bed mobility training/gait training/neuromuscular re-education

## 2019-12-27 NOTE — H&P ADULT - PROBLEM SELECTOR PLAN 6
- hx of depression  - c/w Duloxetine , fluoxetine and trazodone - pt takes Simvastatin 20 mg at home  - c/w Simvastatin 20 mg   - f/u lipid profile  - Dash Diet

## 2019-12-27 NOTE — H&P ADULT - PROBLEM SELECTOR PLAN 2
· Right Lower Extremity: LIMITED ROM, TENDERNESS, no swelling, no warmth - Patient presents s/p fall associated with weakness, no LOC, headache, dizziness, chest pain, SOB.  - PE Right Ankle: limited ROM, tenderness no swelling, no warmth  - likely mechanical, could be due to deconditioning, weakness as patient is not eating and drinking well, on lasix  - CT head Non-contrast :  no acute he or stroke   - Fall Precaution   - f/u Vitamin B12, B6, Folate, TSH , lipid profile , hgb a1c  - f/u PT consult

## 2019-12-27 NOTE — ED PROVIDER NOTE - OBJECTIVE STATEMENT
65 y/o F patient with a significant PMHx of DM, breast cancer (last chemo x3 weeks ago) presents to the ED s/p fall at 5pm today. Patient relates that she was walking to the bathroom when her right lower extremity got weak and gave out. Patient reports she has intermittent right ankle pain but today it got weak causing the fall. Patient states she was unable to get up and her sister contacted EMS after discovering her in the bathroom. Patient relates her right lower extremity is feeling baseline at this point but still c/o of right ankle pain. Patient denies any head trauma, LOC, or any other complaints. Patient denies any nausea, vomiting, abdominal pain, back pain, numbness, tingling, fever, headache or any other complaints.

## 2019-12-27 NOTE — PHYSICAL THERAPY INITIAL EVALUATION ADULT - RANGE OF MOTION EXAMINATION, REHAB EVAL
bilateral lower extremity ROM was WFL (within functional limits)/bilateral upper extremity ROM was WFL (within functional limits)/except for right ankle DF to neutral due to c/o increase pain with mobility

## 2019-12-28 LAB
ALBUMIN SERPL ELPH-MCNC: 2.8 G/DL — LOW (ref 3.5–5)
ALP SERPL-CCNC: 67 U/L — SIGNIFICANT CHANGE UP (ref 40–120)
ALT FLD-CCNC: 12 U/L DA — SIGNIFICANT CHANGE UP (ref 10–60)
ANION GAP SERPL CALC-SCNC: 6 MMOL/L — SIGNIFICANT CHANGE UP (ref 5–17)
AST SERPL-CCNC: 13 U/L — SIGNIFICANT CHANGE UP (ref 10–40)
BASOPHILS # BLD AUTO: 0.03 K/UL — SIGNIFICANT CHANGE UP (ref 0–0.2)
BASOPHILS NFR BLD AUTO: 0.5 % — SIGNIFICANT CHANGE UP (ref 0–2)
BILIRUB SERPL-MCNC: 0.4 MG/DL — SIGNIFICANT CHANGE UP (ref 0.2–1.2)
BUN SERPL-MCNC: 43 MG/DL — HIGH (ref 7–18)
CALCIUM SERPL-MCNC: 8.9 MG/DL — SIGNIFICANT CHANGE UP (ref 8.4–10.5)
CHLORIDE SERPL-SCNC: 109 MMOL/L — HIGH (ref 96–108)
CO2 SERPL-SCNC: 28 MMOL/L — SIGNIFICANT CHANGE UP (ref 22–31)
CREAT SERPL-MCNC: 2.47 MG/DL — HIGH (ref 0.5–1.3)
CULTURE RESULTS: SIGNIFICANT CHANGE UP
EOSINOPHIL # BLD AUTO: 0.18 K/UL — SIGNIFICANT CHANGE UP (ref 0–0.5)
EOSINOPHIL NFR BLD AUTO: 3.2 % — SIGNIFICANT CHANGE UP (ref 0–6)
GLUCOSE BLDC GLUCOMTR-MCNC: 121 MG/DL — HIGH (ref 70–99)
GLUCOSE BLDC GLUCOMTR-MCNC: 133 MG/DL — HIGH (ref 70–99)
GLUCOSE BLDC GLUCOMTR-MCNC: 144 MG/DL — HIGH (ref 70–99)
GLUCOSE BLDC GLUCOMTR-MCNC: 145 MG/DL — HIGH (ref 70–99)
GLUCOSE SERPL-MCNC: 115 MG/DL — HIGH (ref 70–99)
HCT VFR BLD CALC: 26.7 % — LOW (ref 34.5–45)
HCV AB S/CO SERPL IA: 0.1 S/CO — SIGNIFICANT CHANGE UP (ref 0–0.99)
HCV AB SERPL-IMP: SIGNIFICANT CHANGE UP
HGB BLD-MCNC: 8.4 G/DL — LOW (ref 11.5–15.5)
IMM GRANULOCYTES NFR BLD AUTO: 0.4 % — SIGNIFICANT CHANGE UP (ref 0–1.5)
LYMPHOCYTES # BLD AUTO: 0.93 K/UL — LOW (ref 1–3.3)
LYMPHOCYTES # BLD AUTO: 16.6 % — SIGNIFICANT CHANGE UP (ref 13–44)
MAGNESIUM SERPL-MCNC: 2 MG/DL — SIGNIFICANT CHANGE UP (ref 1.6–2.6)
MCHC RBC-ENTMCNC: 29.1 PG — SIGNIFICANT CHANGE UP (ref 27–34)
MCHC RBC-ENTMCNC: 31.5 GM/DL — LOW (ref 32–36)
MCV RBC AUTO: 92.4 FL — SIGNIFICANT CHANGE UP (ref 80–100)
MONOCYTES # BLD AUTO: 0.44 K/UL — SIGNIFICANT CHANGE UP (ref 0–0.9)
MONOCYTES NFR BLD AUTO: 7.8 % — SIGNIFICANT CHANGE UP (ref 2–14)
NEUTROPHILS # BLD AUTO: 4.01 K/UL — SIGNIFICANT CHANGE UP (ref 1.8–7.4)
NEUTROPHILS NFR BLD AUTO: 71.5 % — SIGNIFICANT CHANGE UP (ref 43–77)
NRBC # BLD: 0 /100 WBCS — SIGNIFICANT CHANGE UP (ref 0–0)
PHOSPHATE SERPL-MCNC: 4.1 MG/DL — SIGNIFICANT CHANGE UP (ref 2.5–4.5)
PLATELET # BLD AUTO: 251 K/UL — SIGNIFICANT CHANGE UP (ref 150–400)
POTASSIUM SERPL-MCNC: 3.1 MMOL/L — LOW (ref 3.5–5.3)
POTASSIUM SERPL-SCNC: 3.1 MMOL/L — LOW (ref 3.5–5.3)
PROT SERPL-MCNC: 6.6 G/DL — SIGNIFICANT CHANGE UP (ref 6–8.3)
RBC # BLD: 2.89 M/UL — LOW (ref 3.8–5.2)
RBC # FLD: 14.9 % — HIGH (ref 10.3–14.5)
SODIUM SERPL-SCNC: 143 MMOL/L — SIGNIFICANT CHANGE UP (ref 135–145)
SPECIMEN SOURCE: SIGNIFICANT CHANGE UP
WBC # BLD: 5.61 K/UL — SIGNIFICANT CHANGE UP (ref 3.8–10.5)
WBC # FLD AUTO: 5.61 K/UL — SIGNIFICANT CHANGE UP (ref 3.8–10.5)

## 2019-12-28 RX ORDER — POTASSIUM CHLORIDE 20 MEQ
40 PACKET (EA) ORAL EVERY 4 HOURS
Refills: 0 | Status: COMPLETED | OUTPATIENT
Start: 2019-12-28 | End: 2019-12-28

## 2019-12-28 RX ORDER — SODIUM CHLORIDE 9 MG/ML
1000 INJECTION INTRAMUSCULAR; INTRAVENOUS; SUBCUTANEOUS
Refills: 0 | Status: DISCONTINUED | OUTPATIENT
Start: 2019-12-28 | End: 2019-12-31

## 2019-12-28 RX ADMIN — Medication 50 MILLIGRAM(S): at 12:02

## 2019-12-28 RX ADMIN — OLANZAPINE 10 MILLIGRAM(S): 15 TABLET, FILM COATED ORAL at 12:02

## 2019-12-28 RX ADMIN — Medication 40 MILLIEQUIVALENT(S): at 21:20

## 2019-12-28 RX ADMIN — CARVEDILOL PHOSPHATE 12.5 MILLIGRAM(S): 80 CAPSULE, EXTENDED RELEASE ORAL at 17:14

## 2019-12-28 RX ADMIN — SIMVASTATIN 20 MILLIGRAM(S): 20 TABLET, FILM COATED ORAL at 21:20

## 2019-12-28 RX ADMIN — AMLODIPINE BESYLATE 10 MILLIGRAM(S): 2.5 TABLET ORAL at 06:27

## 2019-12-28 RX ADMIN — ENOXAPARIN SODIUM 30 MILLIGRAM(S): 100 INJECTION SUBCUTANEOUS at 12:02

## 2019-12-28 RX ADMIN — SODIUM CHLORIDE 70 MILLILITER(S): 9 INJECTION INTRAMUSCULAR; INTRAVENOUS; SUBCUTANEOUS at 06:27

## 2019-12-28 RX ADMIN — Medication 40 MILLIEQUIVALENT(S): at 17:14

## 2019-12-28 RX ADMIN — DULOXETINE HYDROCHLORIDE 60 MILLIGRAM(S): 30 CAPSULE, DELAYED RELEASE ORAL at 12:02

## 2019-12-28 RX ADMIN — SODIUM CHLORIDE 70 MILLILITER(S): 9 INJECTION INTRAMUSCULAR; INTRAVENOUS; SUBCUTANEOUS at 17:14

## 2019-12-28 RX ADMIN — CARVEDILOL PHOSPHATE 12.5 MILLIGRAM(S): 80 CAPSULE, EXTENDED RELEASE ORAL at 06:27

## 2019-12-28 RX ADMIN — Medication 20 MILLIGRAM(S): at 21:19

## 2019-12-28 NOTE — PROGRESS NOTE ADULT - SUBJECTIVE AND OBJECTIVE BOX
Patient is a 64y old  Female who presents with a chief complaint of YASMINE (27 Dec 2019 16:20), fall      INTERVAL HPI/OVERNIGHT EVENTS:  T(C): 36.8 (19 @ 08:37), Max: 37.2 (19 @ 00:50)  HR: 75 (19 @ 08:37) (73 - 84)  BP: 138/73 (19 @ 08:37) (137/55 - 150/54)  RR: 16 (19 @ 08:37) (16 - 18)  SpO2: 100% (19 @ 08:37) (100% - 100%)  Wt(kg): --  I&O's Summary      LABS:                        8.4    5.61  )-----------( 251      ( 28 Dec 2019 07:25 )             26.7         143  |  109<H>  |  43<H>  ----------------------------<  115<H>  3.1<L>   |  28  |  2.47<H>    Ca    8.9      28 Dec 2019 07:25  Phos  4.1       Mg     2.0         TPro  6.6  /  Alb  2.8<L>  /  TBili  0.4  /  DBili  x   /  AST  13  /  ALT  12  /  AlkPhos  67      PT/INR - ( 27 Dec 2019 02:14 )   PT: 11.7 sec;   INR: 1.05 ratio         PTT - ( 27 Dec 2019 02:14 )  PTT:34.7 sec  Urinalysis Basic - ( 27 Dec 2019 04:22 )    Color: Yellow / Appearance: Slightly Turbid / S.010 / pH: x  Gluc: x / Ketone: Negative  / Bili: Negative / Urobili: Negative   Blood: x / Protein: 15 / Nitrite: Negative   Leuk Esterase: Trace / RBC: 10-25 /HPF / WBC 0-2 /HPF   Sq Epi: x / Non Sq Epi: Few /HPF / Bacteria: Many /HPF      CAPILLARY BLOOD GLUCOSE      POCT Blood Glucose.: 144 mg/dL (28 Dec 2019 11:53)  POCT Blood Glucose.: 121 mg/dL (28 Dec 2019 08:49)  POCT Blood Glucose.: 159 mg/dL (27 Dec 2019 21:09)  POCT Blood Glucose.: 132 mg/dL (27 Dec 2019 17:08)        Urinalysis Basic - ( 27 Dec 2019 04:22 )    Color: Yellow / Appearance: Slightly Turbid / S.010 / pH: x  Gluc: x / Ketone: Negative  / Bili: Negative / Urobili: Negative   Blood: x / Protein: 15 / Nitrite: Negative   Leuk Esterase: Trace / RBC: 10-25 /HPF / WBC 0-2 /HPF   Sq Epi: x / Non Sq Epi: Few /HPF / Bacteria: Many /HPF      REVIEW OF SYSTEMS:  CONSTITUTIONAL: No fever, weight loss, or fatigue  EYES: No eye pain, visual disturbances, or discharge  ENMT:  No difficulty hearing, tinnitus, vertigo; No sinus or throat pain  NECK: No pain or stiffness  BREASTS: No pain, masses, or nipple discharge  RESPIRATORY: No cough, wheezing, chills or hemoptysis; No shortness of breath  CARDIOVASCULAR: No chest pain, palpitations, dizziness, or leg swelling  GASTROINTESTINAL: No abdominal or epigastric pain. No nausea, vomiting, or hematemesis; No diarrhea or constipation. No melena or hematochezia.  GENITOURINARY: No dysuria, frequency, hematuria, or incontinence  NEUROLOGICAL: No headaches, memory loss, loss of strength, numbness, or tremors  SKIN: No itching, burning, rashes, or lesions   LYMPH NODES: No enlarged glands  ENDOCRINE: No heat or cold intolerance; No hair loss  MUSCULOSKELETAL: No joint pain or swelling; No muscle, back, or extremity pain  PSYCHIATRIC: No depression, anxiety, mood swings, or difficulty sleeping  HEME/LYMPH: No easy bruising, or bleeding gums  ALLERY AND IMMUNOLOGIC: No hives or eczema    RADIOLOGY & ADDITIONAL TESTS:    Imaging Personally Reviewed:  [ ] YES  [ ] NO    Consultant(s) Notes Reviewed:  [ ] YES  [ ] NO    PHYSICAL EXAM:  GENERAL: NAD, well-groomed, well-developed  HEAD:  Atraumatic, Normocephalic  EYES: EOMI, PERRLA, conjunctiva and sclera clear  ENMT: No tonsillar erythema, exudates, or enlargement; Moist mucous membranes, Good dentition, No lesions  NECK: Supple, No JVD, Normal thyroid  NERVOUS SYSTEM:  Alert & Oriented X3, Good concentration; Motor Strength 5/5 B/L upper and lower extremities; DTRs 2+ intact and symmetric  CHEST/LUNG: Clear to percussion bilaterally; No rales, rhonchi, wheezing, or rubs  HEART: Regular rate and rhythm; No murmurs, rubs, or gallops  ABDOMEN: Soft, Nontender, Nondistended; Bowel sounds present  EXTREMITIES:  2+ Peripheral Pulses, No clubbing, cyanosis, or edema  LYMPH: No lymphadenopathy noted  SKIN: No rashes or lesions    Care Discussed with Consultants/Other Providers [ ] YES  [ ] NO    amLODIPine   Tablet 10 milliGRAM(s) Oral daily  carvedilol 12.5 milliGRAM(s) Oral every 12 hours  DULoxetine 60 milliGRAM(s) Oral daily  enoxaparin Injectable 30 milliGRAM(s) SubCutaneous daily  FLUoxetine 20 milliGRAM(s) Oral at bedtime  insulin lispro (HumaLOG) corrective regimen sliding scale   SubCutaneous Before meals and at bedtime  OLANZapine 10 milliGRAM(s) Oral daily  ondansetron    Tablet 8 milliGRAM(s) Oral three times a day PRN Nausea and/or Vomiting  simvastatin 20 milliGRAM(s) Oral at bedtime  sodium chloride 0.9%. 1000 milliLiter(s) IV Continuous <Continuous>  traZODone 50 milliGRAM(s) Oral daily      A/P s/p Fall, YASMINE on CKD, DM, HTN, HLH      PT, discharge planning    d/c home on  Monday with home PT, daughter demands to have walker as d/w her at bedside.

## 2019-12-28 NOTE — PROGRESS NOTE ADULT - SUBJECTIVE AND OBJECTIVE BOX
CC: Patient denies CP, SOB, n/v/d, fever or chills.    Vital Signs Last 24 Hrs  T(C): 36.7 (28 Dec 2019 16:05), Max: 37.2 (28 Dec 2019 00:50)  T(F): 98 (28 Dec 2019 16:05), Max: 99 (28 Dec 2019 00:50)  HR: 70 (28 Dec 2019 16:05) (70 - 84)  BP: 138/73 (28 Dec 2019 08:37) (137/55 - 150/54)  BP(mean): --  RR: 17 (28 Dec 2019 16:05) (16 - 18)  SpO2: 100% (28 Dec 2019 16:05) (100% - 100%)    PHYSICAL EXAM:  GENERAL: NAD, well-nourished, well-developed  HEAD:  Atraumatic, Normocephalic  EYES: Sclera anicteric  ENMT: Moist mucous membranes  NECK: Supple, No JVD  NERVOUS SYSTEM:  Alert & Oriented X3  CHEST/LUNG: Clear to auscultation  HEART: Regular rate and rhythm; No murmurs  ABDOMEN: Soft, Nontender, Nondistended; Bowel sounds present  EXTREMITIES:  No edema  SKIN: Normal turgor    MEDICATIONS:  amLODIPine   Tablet 10 milliGRAM(s) Oral daily  carvedilol 12.5 milliGRAM(s) Oral every 12 hours  DULoxetine 60 milliGRAM(s) Oral daily  enoxaparin Injectable 30 milliGRAM(s) SubCutaneous daily  FLUoxetine 20 milliGRAM(s) Oral at bedtime  insulin lispro (HumaLOG) corrective regimen sliding scale   SubCutaneous Before meals and at bedtime  OLANZapine 10 milliGRAM(s) Oral daily  ondansetron    Tablet 8 milliGRAM(s) Oral three times a day PRN  potassium chloride    Tablet ER 40 milliEquivalent(s) Oral every 4 hours  simvastatin 20 milliGRAM(s) Oral at bedtime  sodium chloride 0.9%. 1000 milliLiter(s) IV Continuous <Continuous>  sodium chloride 0.9%. 1000 milliLiter(s) IV Continuous <Continuous>  traZODone 50 milliGRAM(s) Oral daily    LABS:                        8.4    5.61  )-----------( 251      ( 28 Dec 2019 07:25 )             26.7     12-    143  |  109<H>  |  43<H>  ----------------------------<  115<H>  3.1<L>   |  28  |  2.47<H>    Ca    8.9      28 Dec 2019 07:25  Phos  4.1       Mg     2.0         TPro  6.6  /  Alb  2.8<L>  /  TBili  0.4  /  DBili  x   /  AST  13  /  ALT  12  /  AlkPhos  67    PT/INR - ( 27 Dec 2019 02:14 )   PT: 11.7 sec;   INR: 1.05 ratio    PTT - ( 27 Dec 2019 02:14 )  PTT:34.7 sec  Urinalysis Basic - ( 27 Dec 2019 04:22 )    Color: Yellow / Appearance: Slightly Turbid / S.010 / pH: x  Gluc: x / Ketone: Negative  / Bili: Negative / Urobili: Negative   Blood: x / Protein: 15 / Nitrite: Negative   Leuk Esterase: Trace / RBC: 10-25 /HPF / WBC 0-2 /HPF   Sq Epi: x / Non Sq Epi: Few /HPF / Bacteria: Many /HPF    ASSESSMENT AND PLAN:     1. YASMINE secondary to volume depletion improving  2. R/O CKD, DM/HTN related kidney disease  3. Hypokalemia is depletional  4. HTN is controlled  Continue current IVF  Supplement K  Keep patient euvolemic and renal diet  Avoid Nephrtoxic Meds/ Agents such as NSAIDs, Gadolinium contrast, Phosphate containing enemas, etc..)  Adjust Medications according to eGFR  Obtain Renal US  Follow BMP, H/H

## 2019-12-29 LAB
ALBUMIN SERPL ELPH-MCNC: 2.7 G/DL — LOW (ref 3.5–5)
ALP SERPL-CCNC: 61 U/L — SIGNIFICANT CHANGE UP (ref 40–120)
ALT FLD-CCNC: 13 U/L DA — SIGNIFICANT CHANGE UP (ref 10–60)
ANION GAP SERPL CALC-SCNC: 8 MMOL/L — SIGNIFICANT CHANGE UP (ref 5–17)
AST SERPL-CCNC: 11 U/L — SIGNIFICANT CHANGE UP (ref 10–40)
BILIRUB SERPL-MCNC: 0.4 MG/DL — SIGNIFICANT CHANGE UP (ref 0.2–1.2)
BUN SERPL-MCNC: 41 MG/DL — HIGH (ref 7–18)
CALCIUM SERPL-MCNC: 8.9 MG/DL — SIGNIFICANT CHANGE UP (ref 8.4–10.5)
CHLORIDE SERPL-SCNC: 108 MMOL/L — SIGNIFICANT CHANGE UP (ref 96–108)
CO2 SERPL-SCNC: 26 MMOL/L — SIGNIFICANT CHANGE UP (ref 22–31)
CREAT SERPL-MCNC: 2.18 MG/DL — HIGH (ref 0.5–1.3)
GLUCOSE BLDC GLUCOMTR-MCNC: 121 MG/DL — HIGH (ref 70–99)
GLUCOSE BLDC GLUCOMTR-MCNC: 134 MG/DL — HIGH (ref 70–99)
GLUCOSE BLDC GLUCOMTR-MCNC: 144 MG/DL — HIGH (ref 70–99)
GLUCOSE BLDC GLUCOMTR-MCNC: 210 MG/DL — HIGH (ref 70–99)
GLUCOSE SERPL-MCNC: 107 MG/DL — HIGH (ref 70–99)
HCT VFR BLD CALC: 25.9 % — LOW (ref 34.5–45)
HGB BLD-MCNC: 8 G/DL — LOW (ref 11.5–15.5)
MAGNESIUM SERPL-MCNC: 2.3 MG/DL — SIGNIFICANT CHANGE UP (ref 1.6–2.6)
MCHC RBC-ENTMCNC: 29 PG — SIGNIFICANT CHANGE UP (ref 27–34)
MCHC RBC-ENTMCNC: 30.9 GM/DL — LOW (ref 32–36)
MCV RBC AUTO: 93.8 FL — SIGNIFICANT CHANGE UP (ref 80–100)
NRBC # BLD: 0 /100 WBCS — SIGNIFICANT CHANGE UP (ref 0–0)
PHOSPHATE SERPL-MCNC: 3.3 MG/DL — SIGNIFICANT CHANGE UP (ref 2.5–4.5)
PLATELET # BLD AUTO: 267 K/UL — SIGNIFICANT CHANGE UP (ref 150–400)
POTASSIUM SERPL-MCNC: 3.6 MMOL/L — SIGNIFICANT CHANGE UP (ref 3.5–5.3)
POTASSIUM SERPL-SCNC: 3.6 MMOL/L — SIGNIFICANT CHANGE UP (ref 3.5–5.3)
PROT SERPL-MCNC: 6.7 G/DL — SIGNIFICANT CHANGE UP (ref 6–8.3)
RBC # BLD: 2.76 M/UL — LOW (ref 3.8–5.2)
RBC # FLD: 15 % — HIGH (ref 10.3–14.5)
SODIUM SERPL-SCNC: 142 MMOL/L — SIGNIFICANT CHANGE UP (ref 135–145)
WBC # BLD: 5.47 K/UL — SIGNIFICANT CHANGE UP (ref 3.8–10.5)
WBC # FLD AUTO: 5.47 K/UL — SIGNIFICANT CHANGE UP (ref 3.8–10.5)

## 2019-12-29 RX ORDER — POTASSIUM CHLORIDE 20 MEQ
40 PACKET (EA) ORAL ONCE
Refills: 0 | Status: COMPLETED | OUTPATIENT
Start: 2019-12-29 | End: 2019-12-29

## 2019-12-29 RX ADMIN — AMLODIPINE BESYLATE 10 MILLIGRAM(S): 2.5 TABLET ORAL at 06:41

## 2019-12-29 RX ADMIN — CARVEDILOL PHOSPHATE 12.5 MILLIGRAM(S): 80 CAPSULE, EXTENDED RELEASE ORAL at 06:41

## 2019-12-29 RX ADMIN — SIMVASTATIN 20 MILLIGRAM(S): 20 TABLET, FILM COATED ORAL at 21:53

## 2019-12-29 RX ADMIN — DULOXETINE HYDROCHLORIDE 60 MILLIGRAM(S): 30 CAPSULE, DELAYED RELEASE ORAL at 12:07

## 2019-12-29 RX ADMIN — CARVEDILOL PHOSPHATE 12.5 MILLIGRAM(S): 80 CAPSULE, EXTENDED RELEASE ORAL at 17:29

## 2019-12-29 RX ADMIN — OLANZAPINE 10 MILLIGRAM(S): 15 TABLET, FILM COATED ORAL at 12:07

## 2019-12-29 RX ADMIN — Medication 50 MILLIGRAM(S): at 12:14

## 2019-12-29 RX ADMIN — Medication 20 MILLIGRAM(S): at 21:53

## 2019-12-29 RX ADMIN — Medication 2: at 12:08

## 2019-12-29 RX ADMIN — Medication 40 MILLIEQUIVALENT(S): at 06:41

## 2019-12-29 RX ADMIN — ENOXAPARIN SODIUM 30 MILLIGRAM(S): 100 INJECTION SUBCUTANEOUS at 12:15

## 2019-12-29 NOTE — PROGRESS NOTE ADULT - PROBLEM SELECTOR PLAN 6
- pt takes Simvastatin 20 mg at home  - c/w Simvastatin 20 mg   - f/u lipid profile  - Dash Diet - pt takes Simvastatin 20 mg at home  - c/w Simvastatin 20 mg   - lipid profile normal  - Dash Diet

## 2019-12-29 NOTE — PROGRESS NOTE ADULT - PROBLEM SELECTOR PLAN 5
- Patient takes Glipizide-Metformin at home  - will hold home medications  - will start sliding scale  - Monitor blood sugars AC/HS and adjust as needed  - goal -180.   - f/u HgA1c  - Carbohydrate consistent diabetic diet with evening snacks. - Patient takes Glipizide-Metformin at home  - will start sliding scale  - Monitor blood sugars AC/HS and adjust as needed  - goal -180.   -  HgA1c 6.4  - Carbohydrate consistent diabetic diet with evening snacks.

## 2019-12-29 NOTE — PROGRESS NOTE ADULT - PROBLEM SELECTOR PLAN 4
- Pt taking Lasix , HCTZ , Lisinopril , Carvedilol and Amlodipine at home    - c/w Carvedilol and Amlodipine with parameters  - Monitor BP closely and adjust medications if clinically indicated.  - DASH diet. - c/w Carvedilol and Amlodipine with parameters  - Monitor BP closely and adjust medications if clinically indicated.  - DASH diet.

## 2019-12-29 NOTE — PROGRESS NOTE ADULT - PROBLEM SELECTOR PLAN 2
- Patient presents s/p fall associated with weakness, no LOC, headache, dizziness, chest pain, SOB.  - PE Right Ankle: limited ROM, tenderness no swelling, no warmth  - likely mechanical, could be due to deconditioning, weakness as patient is not eating and drinking well, on lasix  - CT head Non-contrast :  no acute he or stroke   - Fall Precaution   - f/u Vitamin B12, B6, Folate, TSH , lipid profile , hgb a1c  - f/u PT consult - Patient presents s/p fall associated with weakness, no LOC, headache, dizziness, chest pain, SOB.  - PE Right Ankle: limited ROM, tenderness no swelling, no warmth  - likely mechanical, could be due to deconditioning, weakness as patient is not eating and drinking well,   - CT head Non-contrast :  no acute he or stroke   - Fall Precaution   - f/u Vitamin B12, B6, Folate, TSH , lipid profile , hgb a1c  - PT recommends Home PT and home assistance  D/C planning on monday

## 2019-12-29 NOTE — PROGRESS NOTE ADULT - PROBLEM SELECTOR PLAN 1
- p/w BUN/Cr 53/3.02   - unknown baseline  - UA -ve  - K 3.1    - pt was taking Lasix , HCTZ and Lisinopril  - will hold Lasix , HCTZ and Lisinopril given YASMINE   - started IV fluids  - Monitor renal function , intake and output   - f/u BMP , Cr urine , Pr/Cr ratio , Urine lytes , Sodium urine , total protein urine , FeNa  - f/u Renal and bladder US to assess kidney size and r/o hydronephrosis.  - low K , low PO4 Diet -BUN/Cr 43/2.47 improving  - unknown baseline  - UA -ve  - Lasix , HCTZ and Lisinopril on hold given YASMINE   - started IV fluids  -  renal function improving , intake and output   - f/u Renal and bladder US to assess kidney size and r/o hydronephrosis.  - low K , low PO4 Diet

## 2019-12-29 NOTE — PROGRESS NOTE ADULT - ASSESSMENT
64 year old female from home lives with daughter ambulates independently with a significant PMHx of DM, hypertension , HLD , Depression , breast cancer (s/p chemo 4 months ago , last chemo x3 weeks ago) and PSHx of Thyroidectomy presents to the ED s/p fall at 5pm today. Patient relates that she was walking to the bathroom when her right lower extremity got weak and gave out then she fell on her buttocks . Patient reports she has intermittent right ankle pain but today it got weak causing the fall. Patient states she was unable to get up and her sister contacted EMS after discovering her in the bathroom. Patient relates her right lower extremity is feeling baseline at this point but still c/o of right ankle pain. Pt had multiple falls in the past . Patient denies any head trauma, LOC, or any other complaints. Patient denies any nausea, vomiting, abdominal pain, back pain, numbness, tingling, fever, headache or any other complaints. Denies smoking, alcohol, illicit drug use. NKDA .     In ED :   labs were significant for elevated creatinine- pt states was told in past her kidney was at 40%.   CT head : WNL.   x-ray ankle : no obvious fx    Pt has oncologist in [place] Dr. Kay  (Morning team to get the number from sister and contact him regarding pt Chemo current regimen   ****Morning team to contact patients sister for home med rec and to get the number of her oncologist in [place] Dr. Kay  **** Reconciled the medications that ED Attending added for now     Pt is FULL CODE.     Pt is admitted for management of YASMINE 64 year old female from home lives with daughter ambulates independently with a significant PMHx of DM, hypertension , HLD , Depression , breast cancer (s/p chemo 4 months ago , last chemo x3 weeks ago) and PSHx of Thyroidectomy presents to the ED s/p fall at 5pm today. Patient relates that she was walking to the bathroom when her right lower extremity got weak and gave out then she fell on her buttocks . Patient reports she has intermittent right ankle pain but today it got weak causing the fall. Patient states she was unable to get up and her sister contacted EMS after discovering her in the bathroom. Patient relates her right lower extremity is feeling baseline at this point but still c/o of right ankle pain. Pt had multiple falls in the past . Patient denies any head trauma, LOC, or any other complaints. Patient denies any nausea, vomiting, abdominal pain, back pain, numbness, tingling, fever, headache or any other complaints. Denies smoking, alcohol, illicit drug use. NKDA .     In ED :   labs were significant for elevated creatinine- pt states was told in past her kidney was at 40%.   CT head : WNL.   x-ray ankle : no obvious fx    Pt is FULL CODE.     Pt is admitted for management of YASMINE

## 2019-12-29 NOTE — PROGRESS NOTE ADULT - PROBLEM SELECTOR PLAN 8
IMPROVE VTE Individual Risk Assessment    RISK                                                                Points    [  ] Previous VTE                                                  3    [  ] Thrombophilia                                               2    [  ] Lower limb paralysis                                      2        (unable to hold up >15 seconds)    [  ] Current Cancer                                              2         (within 6 months)  [X] Immobilization > 24 hrs                                1  [  ] ICU/CCU stay > 24 hours                              1  [X] Age > 60                                                      1    IMPROVE VTE Score _____2____  c/w Lovenox 30 mg qd (renally dosed)   no need for GI ppx.

## 2019-12-29 NOTE — PROGRESS NOTE ADULT - PROBLEM SELECTOR PLAN 3
- K is 3.1 on admission,   - likely secondary to poor PO intake  - replacing KCl 20mg x 1 (given YASMINE)  - Continue to monitor electrolytes.  - f/u BMP - K being replaced  - likely secondary to poor PO intake  - Continue to monitor electrolytes.  - f/u BMP

## 2019-12-29 NOTE — PROGRESS NOTE ADULT - SUBJECTIVE AND OBJECTIVE BOX
PGY 1 Note discussed with primary attending    Patient is a 64y old  Female who presents with a chief complaint of YASMINE (28 Dec 2019 19:21)      INTERVAL HPI/OVERNIGHT EVENTS: offers no new complaints; current symptoms resolving    MEDICATIONS  (STANDING):  amLODIPine   Tablet 10 milliGRAM(s) Oral daily  carvedilol 12.5 milliGRAM(s) Oral every 12 hours  DULoxetine 60 milliGRAM(s) Oral daily  enoxaparin Injectable 30 milliGRAM(s) SubCutaneous daily  FLUoxetine 20 milliGRAM(s) Oral at bedtime  insulin lispro (HumaLOG) corrective regimen sliding scale   SubCutaneous Before meals and at bedtime  OLANZapine 10 milliGRAM(s) Oral daily  simvastatin 20 milliGRAM(s) Oral at bedtime  sodium chloride 0.9%. 1000 milliLiter(s) (70 mL/Hr) IV Continuous <Continuous>  sodium chloride 0.9%. 1000 milliLiter(s) (70 mL/Hr) IV Continuous <Continuous>  traZODone 50 milliGRAM(s) Oral daily    MEDICATIONS  (PRN):  ondansetron    Tablet 8 milliGRAM(s) Oral three times a day PRN Nausea and/or Vomiting      __________________________________________________  REVIEW OF SYSTEMS:    CONSTITUTIONAL: No fever,   EYES: no acute visual disturbances  NECK: No pain or stiffness  RESPIRATORY: No cough; No shortness of breath  CARDIOVASCULAR: No chest pain, no palpitations  GASTROINTESTINAL: No pain. No nausea or vomiting; No diarrhea   NEUROLOGICAL: No headache or numbness, no tremors  MUSCULOSKELETAL: No joint pain, no muscle pain  GENITOURINARY: no dysuria, no frequency, no hesitancy  PSYCHIATRY: no depression , no anxiety  ALL OTHER  ROS negative        Vital Signs Last 24 Hrs  T(C): 36.7 (28 Dec 2019 16:05), Max: 37.2 (28 Dec 2019 00:50)  T(F): 98 (28 Dec 2019 16:05), Max: 99 (28 Dec 2019 00:50)  HR: 70 (28 Dec 2019 16:05) (70 - 84)  BP: 138/73 (28 Dec 2019 08:37) (137/55 - 150/54)  BP(mean): --  RR: 17 (28 Dec 2019 16:05) (16 - 18)  SpO2: 100% (28 Dec 2019 16:05) (100% - 100%)    ________________________________________________  PHYSICAL EXAM:  GENERAL: NAD  HEENT: Normocephalic;  conjunctivae and sclerae clear; moist mucous membranes;   NECK : supple  CHEST/LUNG: Clear to auscultation bilaterally with good air entry   HEART: S1 S2  regular; no murmurs, gallops or rubs  ABDOMEN: Soft, Nontender, Nondistended; Bowel sounds present  EXTREMITIES: no cyanosis; no edema; no calf tenderness  SKIN: warm and dry; no rash  NERVOUS SYSTEM:  Awake and alert; Oriented  to place, person and time ; no new deficits    _________________________________________________  LABS:                        8.4    5.61  )-----------( 251      ( 28 Dec 2019 07:25 )             26.7         143  |  109<H>  |  43<H>  ----------------------------<  115<H>  3.1<L>   |  28  |  2.47<H>    Ca    8.9      28 Dec 2019 07:25  Phos  4.1       Mg     2.0         TPro  6.6  /  Alb  2.8<L>  /  TBili  0.4  /  DBili  x   /  AST  13  /  ALT  12  /  AlkPhos  67      PT/INR - ( 27 Dec 2019 02:14 )   PT: 11.7 sec;   INR: 1.05 ratio         PTT - ( 27 Dec 2019 02:14 )  PTT:34.7 sec  Urinalysis Basic - ( 27 Dec 2019 04:22 )    Color: Yellow / Appearance: Slightly Turbid / S.010 / pH: x  Gluc: x / Ketone: Negative  / Bili: Negative / Urobili: Negative   Blood: x / Protein: 15 / Nitrite: Negative   Leuk Esterase: Trace / RBC: 10-25 /HPF / WBC 0-2 /HPF   Sq Epi: x / Non Sq Epi: Few /HPF / Bacteria: Many /HPF      CAPILLARY BLOOD GLUCOSE      POCT Blood Glucose.: 133 mg/dL (28 Dec 2019 21:09)  POCT Blood Glucose.: 145 mg/dL (28 Dec 2019 16:49)  POCT Blood Glucose.: 144 mg/dL (28 Dec 2019 11:53)  POCT Blood Glucose.: 121 mg/dL (28 Dec 2019 08:49)        RADIOLOGY & ADDITIONAL TESTS:    Imaging Personally Reviewed:  YES/NO    Consultant(s) Notes Reviewed:   YES/ No    Care Discussed with Consultants :     Plan of care was discussed with patient and /or primary care giver; all questions and concerns were addressed and care was aligned with patient's wishes. PGY 1 Note discussed with primary attending    Patient is a 64y old  Female who presents with a chief complaint of YASMINE (28 Dec 2019 19:21)      INTERVAL HPI/OVERNIGHT EVENTS: offers no new complaints; current symptoms resolving    MEDICATIONS  (STANDING):  amLODIPine   Tablet 10 milliGRAM(s) Oral daily  carvedilol 12.5 milliGRAM(s) Oral every 12 hours  DULoxetine 60 milliGRAM(s) Oral daily  enoxaparin Injectable 30 milliGRAM(s) SubCutaneous daily  FLUoxetine 20 milliGRAM(s) Oral at bedtime  insulin lispro (HumaLOG) corrective regimen sliding scale   SubCutaneous Before meals and at bedtime  OLANZapine 10 milliGRAM(s) Oral daily  simvastatin 20 milliGRAM(s) Oral at bedtime  sodium chloride 0.9%. 1000 milliLiter(s) (70 mL/Hr) IV Continuous <Continuous>  sodium chloride 0.9%. 1000 milliLiter(s) (70 mL/Hr) IV Continuous <Continuous>  traZODone 50 milliGRAM(s) Oral daily    MEDICATIONS  (PRN):  ondansetron    Tablet 8 milliGRAM(s) Oral three times a day PRN Nausea and/or Vomiting      __________________________________________________  REVIEW OF SYSTEMS:    CONSTITUTIONAL: No fever,   EYES: no acute visual disturbances  NECK: No pain or stiffness  RESPIRATORY: No cough; No shortness of breath  CARDIOVASCULAR: No chest pain, no palpitations  GASTROINTESTINAL: No pain. No nausea or vomiting; No diarrhea   NEUROLOGICAL: No headache or numbness, no tremors  MUSCULOSKELETAL: No joint pain, no muscle pain  GENITOURINARY: no dysuria, no frequency, no hesitancy  PSYCHIATRY: no depression , no anxiety  ALL OTHER  ROS negative        Vital Signs Last 24 Hrs  T(C): 36.7 (28 Dec 2019 16:05), Max: 37.2 (28 Dec 2019 00:50)  T(F): 98 (28 Dec 2019 16:05), Max: 99 (28 Dec 2019 00:50)  HR: 70 (28 Dec 2019 16:05) (70 - 84)  BP: 138/73 (28 Dec 2019 08:37) (137/55 - 150/54)  BP(mean): --  RR: 17 (28 Dec 2019 16:05) (16 - 18)  SpO2: 100% (28 Dec 2019 16:05) (100% - 100%)    ________________________________________________  PHYSICAL EXAM:  GENERAL: NAD  HEENT: Normocephalic;  conjunctivae and sclerae clear; moist mucous membranes;   NECK : supple  CHEST/LUNG: Clear to auscultation bilaterally with good air entry   HEART: S1 S2  regular; no murmurs, gallops or rubs  ABDOMEN: Soft, Nontender, Nondistended; Bowel sounds present  EXTREMITIES: no cyanosis; no edema; no calf tenderness  SKIN: warm and dry; no rash  NERVOUS SYSTEM:  Awake and alert; Oriented  to place, person and time ; no new deficits    _________________________________________________  LABS:                        8.4    5.61  )-----------( 251      ( 28 Dec 2019 07:25 )             26.7         143  |  109<H>  |  43<H>  ----------------------------<  115<H>  3.1<L>   |  28  |  2.47<H>    Ca    8.9      28 Dec 2019 07:25  Phos  4.1       Mg     2.0         TPro  6.6  /  Alb  2.8<L>  /  TBili  0.4  /  DBili  x   /  AST  13  /  ALT  12  /  AlkPhos  67      PT/INR - ( 27 Dec 2019 02:14 )   PT: 11.7 sec;   INR: 1.05 ratio         PTT - ( 27 Dec 2019 02:14 )  PTT:34.7 sec  Urinalysis Basic - ( 27 Dec 2019 04:22 )    Color: Yellow / Appearance: Slightly Turbid / S.010 / pH: x  Gluc: x / Ketone: Negative  / Bili: Negative / Urobili: Negative   Blood: x / Protein: 15 / Nitrite: Negative   Leuk Esterase: Trace / RBC: 10-25 /HPF / WBC 0-2 /HPF   Sq Epi: x / Non Sq Epi: Few /HPF / Bacteria: Many /HPF      CAPILLARY BLOOD GLUCOSE      POCT Blood Glucose.: 133 mg/dL (28 Dec 2019 21:09)  POCT Blood Glucose.: 145 mg/dL (28 Dec 2019 16:49)  POCT Blood Glucose.: 144 mg/dL (28 Dec 2019 11:53)  POCT Blood Glucose.: 121 mg/dL (28 Dec 2019 08:49)        RADIOLOGY & ADDITIONAL TESTS:    Imaging Personally Reviewed:  YES    Consultant(s) Notes Reviewed:   YES  Care Discussed with Consultants :     Plan of care was discussed with patient and /or primary care giver; all questions and concerns were addressed and care was aligned with patient's wishes.

## 2019-12-30 LAB
ALBUMIN SERPL ELPH-MCNC: 2.8 G/DL — LOW (ref 3.5–5)
ALP SERPL-CCNC: 69 U/L — SIGNIFICANT CHANGE UP (ref 40–120)
ALT FLD-CCNC: 13 U/L DA — SIGNIFICANT CHANGE UP (ref 10–60)
ANION GAP SERPL CALC-SCNC: 7 MMOL/L — SIGNIFICANT CHANGE UP (ref 5–17)
AST SERPL-CCNC: 10 U/L — SIGNIFICANT CHANGE UP (ref 10–40)
BILIRUB SERPL-MCNC: 0.3 MG/DL — SIGNIFICANT CHANGE UP (ref 0.2–1.2)
BUN SERPL-MCNC: 44 MG/DL — HIGH (ref 7–18)
CALCIUM SERPL-MCNC: 9.1 MG/DL — SIGNIFICANT CHANGE UP (ref 8.4–10.5)
CHLORIDE SERPL-SCNC: 107 MMOL/L — SIGNIFICANT CHANGE UP (ref 96–108)
CO2 SERPL-SCNC: 26 MMOL/L — SIGNIFICANT CHANGE UP (ref 22–31)
CREAT SERPL-MCNC: 2.31 MG/DL — HIGH (ref 0.5–1.3)
GLUCOSE BLDC GLUCOMTR-MCNC: 144 MG/DL — HIGH (ref 70–99)
GLUCOSE BLDC GLUCOMTR-MCNC: 156 MG/DL — HIGH (ref 70–99)
GLUCOSE BLDC GLUCOMTR-MCNC: 176 MG/DL — HIGH (ref 70–99)
GLUCOSE BLDC GLUCOMTR-MCNC: 179 MG/DL — HIGH (ref 70–99)
GLUCOSE SERPL-MCNC: 148 MG/DL — HIGH (ref 70–99)
HCT VFR BLD CALC: 27.3 % — LOW (ref 34.5–45)
HGB BLD-MCNC: 8.4 G/DL — LOW (ref 11.5–15.5)
MAGNESIUM SERPL-MCNC: 2.1 MG/DL — SIGNIFICANT CHANGE UP (ref 1.6–2.6)
MCHC RBC-ENTMCNC: 28.8 PG — SIGNIFICANT CHANGE UP (ref 27–34)
MCHC RBC-ENTMCNC: 30.8 GM/DL — LOW (ref 32–36)
MCV RBC AUTO: 93.5 FL — SIGNIFICANT CHANGE UP (ref 80–100)
NRBC # BLD: 0 /100 WBCS — SIGNIFICANT CHANGE UP (ref 0–0)
PHOSPHATE SERPL-MCNC: 4.1 MG/DL — SIGNIFICANT CHANGE UP (ref 2.5–4.5)
PLATELET # BLD AUTO: 278 K/UL — SIGNIFICANT CHANGE UP (ref 150–400)
POTASSIUM SERPL-MCNC: 3.9 MMOL/L — SIGNIFICANT CHANGE UP (ref 3.5–5.3)
POTASSIUM SERPL-SCNC: 3.9 MMOL/L — SIGNIFICANT CHANGE UP (ref 3.5–5.3)
PROT SERPL-MCNC: 6.9 G/DL — SIGNIFICANT CHANGE UP (ref 6–8.3)
RBC # BLD: 2.92 M/UL — LOW (ref 3.8–5.2)
RBC # FLD: 14.6 % — HIGH (ref 10.3–14.5)
SODIUM SERPL-SCNC: 140 MMOL/L — SIGNIFICANT CHANGE UP (ref 135–145)
WBC # BLD: 5 K/UL — SIGNIFICANT CHANGE UP (ref 3.8–10.5)
WBC # FLD AUTO: 5 K/UL — SIGNIFICANT CHANGE UP (ref 3.8–10.5)

## 2019-12-30 PROCEDURE — 76770 US EXAM ABDO BACK WALL COMP: CPT | Mod: 26

## 2019-12-30 RX ADMIN — Medication 50 MILLIGRAM(S): at 11:14

## 2019-12-30 RX ADMIN — CARVEDILOL PHOSPHATE 12.5 MILLIGRAM(S): 80 CAPSULE, EXTENDED RELEASE ORAL at 05:43

## 2019-12-30 RX ADMIN — CARVEDILOL PHOSPHATE 12.5 MILLIGRAM(S): 80 CAPSULE, EXTENDED RELEASE ORAL at 17:06

## 2019-12-30 RX ADMIN — SIMVASTATIN 20 MILLIGRAM(S): 20 TABLET, FILM COATED ORAL at 21:56

## 2019-12-30 RX ADMIN — AMLODIPINE BESYLATE 10 MILLIGRAM(S): 2.5 TABLET ORAL at 05:43

## 2019-12-30 RX ADMIN — Medication 1: at 12:03

## 2019-12-30 RX ADMIN — OLANZAPINE 10 MILLIGRAM(S): 15 TABLET, FILM COATED ORAL at 11:14

## 2019-12-30 RX ADMIN — Medication 1: at 21:56

## 2019-12-30 RX ADMIN — Medication 20 MILLIGRAM(S): at 21:56

## 2019-12-30 RX ADMIN — DULOXETINE HYDROCHLORIDE 60 MILLIGRAM(S): 30 CAPSULE, DELAYED RELEASE ORAL at 11:14

## 2019-12-30 RX ADMIN — Medication 1: at 17:06

## 2019-12-30 RX ADMIN — ENOXAPARIN SODIUM 30 MILLIGRAM(S): 100 INJECTION SUBCUTANEOUS at 11:14

## 2019-12-30 NOTE — PROGRESS NOTE ADULT - SUBJECTIVE AND OBJECTIVE BOX
Patient is a 64y old  Female who presents with a chief complaint of YASMINE (30 Dec 2019 14:25)      INTERVAL HPI/OVERNIGHT EVENTS: pt seen and examined, no overnight acute events,        CONSTITUTIONAL: No fever,   EYES: no acute visual disturbances  NECK: No pain or stiffness  RESPIRATORY: No cough; No shortness of breath  CARDIOVASCULAR: No chest pain, no palpitations  GASTROINTESTINAL: No pain. No nausea or vomiting; No diarrhea   NEUROLOGICAL: No headache or numbness, no tremors  MUSCULOSKELETAL: pain in  ankle sp straining   GENITOURINARY: no dysuria, no frequency, no hesitancy  PSYCHIATRY: no depression , no anxiety  ALL OTHER  ROS negative  FAMILY HISTORY:    Vital Signs Last 24 Hrs  T(C): 36.8 (30 Dec 2019 07:53), Max: 37 (29 Dec 2019 23:58)  T(F): 98.2 (30 Dec 2019 07:53), Max: 98.6 (29 Dec 2019 23:58)  HR: 69 (30 Dec 2019 07:53) (69 - 79)  BP: 150/78 (30 Dec 2019 07:53) (142/54 - 151/67)  BP(mean): --  RR: 16 (30 Dec 2019 07:53) (16 - 18)  SpO2: 99% (30 Dec 2019 07:53) (97% - 100%)      PHYSICAL EXAM:  GENERAL: NAD, well-groomed, well-developed  HEAD:  Atraumatic, Normocephalic  EYES: EOMI, PERRLA, conjunctiva and sclera clear  ENMT: No tonsillar erythema, exudates, or enlargement; Moist mucous membranes, Good dentition, No lesions  NECK: Supple, No JVD, Normal thyroid  NERVOUS SYSTEM:  Alert & Oriented X3, Good concentration; Motor Strength 5/5 B/L upper and lower extremities; DTRs 2+ intact and symmetric  CHEST/LUNG: Clear to percussion bilaterally; No rales, rhonchi, wheezing, or rubs  HEART: Regular rate and rhythm; No murmurs, rubs, or gallops  ABDOMEN: Soft, Nontender, Nondistended; Bowel sounds present  EXTREMITIES:  2+ Peripheral Pulses, No clubbing, cyanosis, or edema  LYMPH: No lymphadenopathy noted  SKIN: No rashes or lesions    Consultant(s) Notes Reviewed:  [x ] YES  [ ] NO  Care Discussed with Consultants/Other Providers [ x] YES  [ ] NO    LABS:                      8.4    5.00  )-----------( 278      ( 30 Dec 2019 07:51 )             27.3   12-30    140  |  107  |  44<H>  ----------------------------<  148<H>  3.9   |  26  |  2.31<H>    Ca    9.1      30 Dec 2019 07:51  Phos  4.1     12-30  Mg     2.1     12-30    TPro  6.9  /  Alb  2.8<L>  /  TBili  0.3  /  DBili  x   /  AST  10  /  ALT  13  /  AlkPhos  69  12-30          RADIOLOGY & ADDITIONAL TESTS:    Imaging Personally Reviewed:  [ ] YES  [ ] NO  amLODIPine   Tablet 10 milliGRAM(s) Oral daily  carvedilol 12.5 milliGRAM(s) Oral every 12 hours  DULoxetine 60 milliGRAM(s) Oral daily  enoxaparin Injectable 30 milliGRAM(s) SubCutaneous daily  FLUoxetine 20 milliGRAM(s) Oral at bedtime  insulin lispro (HumaLOG) corrective regimen sliding scale   SubCutaneous Before meals and at bedtime  OLANZapine 10 milliGRAM(s) Oral daily  ondansetron    Tablet 8 milliGRAM(s) Oral three times a day PRN  simvastatin 20 milliGRAM(s) Oral at bedtime  sodium chloride 0.9%. 1000 milliLiter(s) IV Continuous <Continuous>  sodium chloride 0.9%. 1000 milliLiter(s) IV Continuous <Continuous>  traZODone 50 milliGRAM(s) Oral daily      HEALTH ISSUES - PROBLEM Dx:  HLD (hyperlipidemia): HLD (hyperlipidemia)  Goals of care, counseling/discussion: Goals of care, counseling/discussion  Prophylactic measure: Prophylactic measure  Depression: Depression  Diabetes: Diabetes  HTN (hypertension): HTN (hypertension)  Hypokalemia: Hypokalemia  Fall: Fall  YASMINE (acute kidney injury): YASMINE (acute kidney injury)

## 2019-12-30 NOTE — PROGRESS NOTE ADULT - SUBJECTIVE AND OBJECTIVE BOX
CC:     Vital Signs Last 24 Hrs  T(C): 36.8 (30 Dec 2019 07:53), Max: 37 (29 Dec 2019 23:58)  T(F): 98.2 (30 Dec 2019 07:53), Max: 98.6 (29 Dec 2019 23:58)  HR: 69 (30 Dec 2019 07:53) (69 - 79)  BP: 150/78 (30 Dec 2019 07:53) (142/54 - 151/67)  BP(mean): --  RR: 16 (30 Dec 2019 07:53) (16 - 18)  SpO2: 99% (30 Dec 2019 07:53) (97% - 100%)      PHYSICAL EXAM:  GENERAL: NAD, well-nourished, well-developed  HEAD:  Atraumatic, Normocephalic  EYES: Sclera anicteric  ENMT: Moist mucous membranes  NECK: Supple, No JVD  NERVOUS SYSTEM:  Alert & Oriented X3  CHEST/LUNG: Clear to auscultation  HEART: Regular rate and rhythm; No murmurs  ABDOMEN: Soft, Nontender, Nondistended; Bowel sounds present  EXTREMITIES:  No edema  SKIN: Normal turgor    MEDICATIONS:  amLODIPine   Tablet 10 milliGRAM(s) Oral daily  carvedilol 12.5 milliGRAM(s) Oral every 12 hours  DULoxetine 60 milliGRAM(s) Oral daily  enoxaparin Injectable 30 milliGRAM(s) SubCutaneous daily  FLUoxetine 20 milliGRAM(s) Oral at bedtime  insulin lispro (HumaLOG) corrective regimen sliding scale   SubCutaneous Before meals and at bedtime  OLANZapine 10 milliGRAM(s) Oral daily  ondansetron    Tablet 8 milliGRAM(s) Oral three times a day PRN  simvastatin 20 milliGRAM(s) Oral at bedtime  sodium chloride 0.9%. 1000 milliLiter(s) IV Continuous <Continuous>  sodium chloride 0.9%. 1000 milliLiter(s) IV Continuous <Continuous>  traZODone 50 milliGRAM(s) Oral daily    LABS:                        8.4    5.00  )-----------( 278      ( 30 Dec 2019 07:51 )             27.3     12-30    140  |  107  |  44<H>  ----------------------------<  148<H>  3.9   |  26  |  2.31<H>    Ca    9.1      30 Dec 2019 07:51  Phos  4.1     12-30  Mg     2.1     12-30    TPro  6.9  /  Alb  2.8<L>  /  TBili  0.3  /  DBili  x   /  AST  10  /  ALT  13  /  AlkPhos  69  12-30     Renal US: No hydronephrosis    ASSESSMENT AND PLAN:     1. YASMINE secondary to volume depletion improving  2. CKD, DM/HTN related kidney disease  3. Hypokalemia resolved  4. HTN is controlled  Keep patient euvolemic and renal diet  Avoid Nephrtoxic Meds/ Agents such as NSAIDs, Gadolinium contrast, Phosphate containing enemas, etc..)  Adjust Medications according to eGFR  Follow BMP, H/H

## 2019-12-30 NOTE — PROGRESS NOTE ADULT - PROBLEM SELECTOR PLAN 1
-BUN/Cr 43/2.47 improving  - unknown baseline  - UA -ve  - Lasix , HCTZ and Lisinopril on hold given YASMINE   - started IV fluids  -  renal function improving , intake and output   - f/u Renal and bladder US to assess kidney size and r/o hydronephrosis: no hydronephrosis, renal cortical echogenicity  increased , due to medical renal disease   - low K , low PO4 Diet

## 2019-12-30 NOTE — PROGRESS NOTE ADULT - PROBLEM SELECTOR PLAN 4
- c/w Carvedilol and Amlodipine with parameters  - Monitor BP closely and adjust medications if clinically indicated.  - DASH diet.

## 2019-12-30 NOTE — PROGRESS NOTE ADULT - ASSESSMENT
64 year old female from home lives with daughter ambulates independently with a significant PMHx of DM, hypertension , HLD , Depression , breast cancer (s/p chemo 4 months ago , last chemo x3 weeks ago) and PSHx of Thyroidectomy presents to the ED s/p fall at 5pm today. Patient relates that she was walking to the bathroom when her right lower extremity got weak and gave out then she fell on her buttocks . Patient reports she has intermittent right ankle pain but today it got weak causing the fall. Patient states she was unable to get up and her sister contacted EMS after discovering her in the bathroom. Patient relates her right lower extremity is feeling baseline at this point but still c/o of right ankle pain. Pt had multiple falls in the past . Patient denies any head trauma, LOC, or any other complaints. Patient denies any nausea, vomiting, abdominal pain, back pain, numbness, tingling, fever, headache or any other complaints. Denies smoking, alcohol, illicit drug use. NKDA .     In ED :   labs were significant for elevated creatinine- pt states was told in past her kidney was at 40%.   CT head : WNL.   x-ray ankle : no obvious fx    Pt is FULL CODE.     Pt is admitted for management of YASMINE

## 2019-12-30 NOTE — PROGRESS NOTE ADULT - PROBLEM SELECTOR PLAN 5
- Patient takes Glipizide-Metformin at home  - will start sliding scale  - Monitor blood sugars AC/HS and adjust as needed  - goal -180.   -  HgA1c 6.4  - Carbohydrate consistent diabetic diet with evening snacks.

## 2019-12-30 NOTE — PROGRESS NOTE ADULT - PROBLEM SELECTOR PLAN 2
- Patient presents s/p fall associated with weakness, no LOC, headache, dizziness, chest pain, SOB.  - PE Right Ankle: limited ROM, tenderness no swelling, no warmth  - likely mechanical, could be due to deconditioning, weakness as patient is not eating and drinking well,   - CT head Non-contrast :  no acute he or stroke   - Fall Precaution   - f/u Vitamin B12, B6, Folate, TSH , lipid profile , hgb a1c  - PT recommends Home PT and home assistance

## 2019-12-31 VITALS
HEART RATE: 83 BPM | TEMPERATURE: 98 F | OXYGEN SATURATION: 100 % | RESPIRATION RATE: 16 BRPM | DIASTOLIC BLOOD PRESSURE: 72 MMHG | SYSTOLIC BLOOD PRESSURE: 111 MMHG

## 2019-12-31 LAB
ANION GAP SERPL CALC-SCNC: 7 MMOL/L — SIGNIFICANT CHANGE UP (ref 5–17)
BUN SERPL-MCNC: 35 MG/DL — HIGH (ref 7–18)
CALCIUM SERPL-MCNC: 8.8 MG/DL — SIGNIFICANT CHANGE UP (ref 8.4–10.5)
CHLORIDE SERPL-SCNC: 104 MMOL/L — SIGNIFICANT CHANGE UP (ref 96–108)
CO2 SERPL-SCNC: 25 MMOL/L — SIGNIFICANT CHANGE UP (ref 22–31)
CREAT SERPL-MCNC: 1.9 MG/DL — HIGH (ref 0.5–1.3)
GLUCOSE BLDC GLUCOMTR-MCNC: 125 MG/DL — HIGH (ref 70–99)
GLUCOSE BLDC GLUCOMTR-MCNC: 163 MG/DL — HIGH (ref 70–99)
GLUCOSE BLDC GLUCOMTR-MCNC: 221 MG/DL — HIGH (ref 70–99)
GLUCOSE SERPL-MCNC: 270 MG/DL — HIGH (ref 70–99)
HCT VFR BLD CALC: 26.9 % — LOW (ref 34.5–45)
HGB BLD-MCNC: 8.4 G/DL — LOW (ref 11.5–15.5)
MAGNESIUM SERPL-MCNC: 1.9 MG/DL — SIGNIFICANT CHANGE UP (ref 1.6–2.6)
MCHC RBC-ENTMCNC: 28.7 PG — SIGNIFICANT CHANGE UP (ref 27–34)
MCHC RBC-ENTMCNC: 31.2 GM/DL — LOW (ref 32–36)
MCV RBC AUTO: 91.8 FL — SIGNIFICANT CHANGE UP (ref 80–100)
NRBC # BLD: 0 /100 WBCS — SIGNIFICANT CHANGE UP (ref 0–0)
PHOSPHATE SERPL-MCNC: 4 MG/DL — SIGNIFICANT CHANGE UP (ref 2.5–4.5)
PLATELET # BLD AUTO: 295 K/UL — SIGNIFICANT CHANGE UP (ref 150–400)
POTASSIUM SERPL-MCNC: 3.5 MMOL/L — SIGNIFICANT CHANGE UP (ref 3.5–5.3)
POTASSIUM SERPL-SCNC: 3.5 MMOL/L — SIGNIFICANT CHANGE UP (ref 3.5–5.3)
RBC # BLD: 2.93 M/UL — LOW (ref 3.8–5.2)
RBC # FLD: 14.4 % — SIGNIFICANT CHANGE UP (ref 10.3–14.5)
SODIUM SERPL-SCNC: 136 MMOL/L — SIGNIFICANT CHANGE UP (ref 135–145)
WBC # BLD: 4.85 K/UL — SIGNIFICANT CHANGE UP (ref 3.8–10.5)
WBC # FLD AUTO: 4.85 K/UL — SIGNIFICANT CHANGE UP (ref 3.8–10.5)

## 2019-12-31 PROCEDURE — 82306 VITAMIN D 25 HYDROXY: CPT

## 2019-12-31 PROCEDURE — 83036 HEMOGLOBIN GLYCOSYLATED A1C: CPT

## 2019-12-31 PROCEDURE — 86803 HEPATITIS C AB TEST: CPT

## 2019-12-31 PROCEDURE — 82607 VITAMIN B-12: CPT

## 2019-12-31 PROCEDURE — 70450 CT HEAD/BRAIN W/O DYE: CPT

## 2019-12-31 PROCEDURE — 84443 ASSAY THYROID STIM HORMONE: CPT

## 2019-12-31 PROCEDURE — 84300 ASSAY OF URINE SODIUM: CPT

## 2019-12-31 PROCEDURE — 80061 LIPID PANEL: CPT

## 2019-12-31 PROCEDURE — 83935 ASSAY OF URINE OSMOLALITY: CPT

## 2019-12-31 PROCEDURE — 82436 ASSAY OF URINE CHLORIDE: CPT

## 2019-12-31 PROCEDURE — 82550 ASSAY OF CK (CPK): CPT

## 2019-12-31 PROCEDURE — 85610 PROTHROMBIN TIME: CPT

## 2019-12-31 PROCEDURE — 84560 ASSAY OF URINE/URIC ACID: CPT

## 2019-12-31 PROCEDURE — 99285 EMERGENCY DEPT VISIT HI MDM: CPT | Mod: 25

## 2019-12-31 PROCEDURE — 73610 X-RAY EXAM OF ANKLE: CPT

## 2019-12-31 PROCEDURE — 80048 BASIC METABOLIC PNL TOTAL CA: CPT

## 2019-12-31 PROCEDURE — 80053 COMPREHEN METABOLIC PANEL: CPT

## 2019-12-31 PROCEDURE — 85730 THROMBOPLASTIN TIME PARTIAL: CPT

## 2019-12-31 PROCEDURE — 86850 RBC ANTIBODY SCREEN: CPT

## 2019-12-31 PROCEDURE — 84207 ASSAY OF VITAMIN B-6: CPT

## 2019-12-31 PROCEDURE — 83880 ASSAY OF NATRIURETIC PEPTIDE: CPT

## 2019-12-31 PROCEDURE — 87086 URINE CULTURE/COLONY COUNT: CPT

## 2019-12-31 PROCEDURE — 84156 ASSAY OF PROTEIN URINE: CPT

## 2019-12-31 PROCEDURE — 71045 X-RAY EXAM CHEST 1 VIEW: CPT

## 2019-12-31 PROCEDURE — 82962 GLUCOSE BLOOD TEST: CPT

## 2019-12-31 PROCEDURE — 84100 ASSAY OF PHOSPHORUS: CPT

## 2019-12-31 PROCEDURE — 82746 ASSAY OF FOLIC ACID SERUM: CPT

## 2019-12-31 PROCEDURE — 97162 PT EVAL MOD COMPLEX 30 MIN: CPT

## 2019-12-31 PROCEDURE — 81001 URINALYSIS AUTO W/SCOPE: CPT

## 2019-12-31 PROCEDURE — 84484 ASSAY OF TROPONIN QUANT: CPT

## 2019-12-31 PROCEDURE — 36415 COLL VENOUS BLD VENIPUNCTURE: CPT

## 2019-12-31 PROCEDURE — 73630 X-RAY EXAM OF FOOT: CPT

## 2019-12-31 PROCEDURE — 83735 ASSAY OF MAGNESIUM: CPT

## 2019-12-31 PROCEDURE — 85027 COMPLETE CBC AUTOMATED: CPT

## 2019-12-31 PROCEDURE — 76770 US EXAM ABDO BACK WALL COMP: CPT

## 2019-12-31 PROCEDURE — 86901 BLOOD TYPING SEROLOGIC RH(D): CPT

## 2019-12-31 PROCEDURE — 93306 TTE W/DOPPLER COMPLETE: CPT

## 2019-12-31 PROCEDURE — 86900 BLOOD TYPING SEROLOGIC ABO: CPT

## 2019-12-31 RX ORDER — FUROSEMIDE 40 MG
0 TABLET ORAL
Qty: 0 | Refills: 0 | DISCHARGE

## 2019-12-31 RX ORDER — LISINOPRIL 2.5 MG/1
1 TABLET ORAL
Qty: 0 | Refills: 0 | DISCHARGE

## 2019-12-31 RX ADMIN — CARVEDILOL PHOSPHATE 12.5 MILLIGRAM(S): 80 CAPSULE, EXTENDED RELEASE ORAL at 06:30

## 2019-12-31 RX ADMIN — Medication 2: at 12:13

## 2019-12-31 RX ADMIN — OLANZAPINE 10 MILLIGRAM(S): 15 TABLET, FILM COATED ORAL at 12:14

## 2019-12-31 RX ADMIN — DULOXETINE HYDROCHLORIDE 60 MILLIGRAM(S): 30 CAPSULE, DELAYED RELEASE ORAL at 12:14

## 2019-12-31 RX ADMIN — Medication 50 MILLIGRAM(S): at 12:14

## 2019-12-31 RX ADMIN — Medication 1: at 08:47

## 2019-12-31 RX ADMIN — AMLODIPINE BESYLATE 10 MILLIGRAM(S): 2.5 TABLET ORAL at 06:30

## 2019-12-31 RX ADMIN — ENOXAPARIN SODIUM 30 MILLIGRAM(S): 100 INJECTION SUBCUTANEOUS at 12:13

## 2019-12-31 NOTE — PROGRESS NOTE ADULT - SUBJECTIVE AND OBJECTIVE BOX
pt seen,  examined case discussed with renal attending    SUBJECTIVE:  pt feels fine and denies cp,sob,gi or gu/uremic  symptoms    REVIEW OF SYSTEMS:  CONSTITUTIONAL: No weakness, fevers or chills  EYES/ENT: No visual changes;  No vertigo or throat pain   NECK: No pain or stiffness  RESPIRATORY: No cough, wheezing, hemoptysis; No shortness of breath  CARDIOVASCULAR: No chest pain or palpitations  GASTROINTESTINAL: No abdominal or epigastric pain. No nausea, vomiting, or hematemesis; No diarrhea or constipation. No melena or hematochezia.  GENITOURINARY: No dysuria, frequency , hematuria, flank pain or nocturia  NEUROLOGICAL: No numbness or weakness  SKIN: No itching, burning, rashes, or lesions   All other review of systems is negative unless indicated above    Current meds:    amLODIPine   Tablet 10 milliGRAM(s) Oral daily  carvedilol 12.5 milliGRAM(s) Oral every 12 hours  DULoxetine 60 milliGRAM(s) Oral daily  enoxaparin Injectable 30 milliGRAM(s) SubCutaneous daily  FLUoxetine 20 milliGRAM(s) Oral at bedtime  insulin lispro (HumaLOG) corrective regimen sliding scale   SubCutaneous Before meals and at bedtime  OLANZapine 10 milliGRAM(s) Oral daily  ondansetron    Tablet 8 milliGRAM(s) Oral three times a day PRN  simvastatin 20 milliGRAM(s) Oral at bedtime  sodium chloride 0.9%. 1000 milliLiter(s) IV Continuous <Continuous>  sodium chloride 0.9%. 1000 milliLiter(s) IV Continuous <Continuous>  traZODone 50 milliGRAM(s) Oral daily      Vital Signs    T(F): 97.8 (12-31-19 @ 08:08), Max: 98.2 (12-31-19 @ 00:31)  HR: 77 (12-31-19 @ 08:08) (69 - 81)  BP: 161/71 (12-31-19 @ 08:08) (142/71 - 168/78)  ABP: --  RR: 17 (12-31-19 @ 08:08) (16 - 17)  SpO2: 100% (12-31-19 @ 08:08) (99% - 100%)  Wt(kg): --  CVP(cm H2O): --  CO: --  PCWP: --    I and O's:    Daily     Daily     PHYSICAL EXAM:  Constitutional: well developed, well nourished  and in nad  HEENT: PERRLA,  no icteric sclera and mild pallor of conjunctiva noted  Neck: No JVD, thyromegaly or adenopathy  Respiratory: reduced air entry lower lungs with no rales, wheezing or rhonchi  Cardiovascular: S1 and S2 normally heard  Gastrointestinal: soft, nondistended, nontender and normal bowel sounds heard  Extremities: No peripheral edema or cyanosis  Neurological: A/O x 3, no focal deficits  : No flank or cva tenderness palpated.  Skin: No rashes      LABS:    CBC:                          8.4    5.00  )-----------( 278      ( 30 Dec 2019 07:51 )             27.3           BMP:    12-30    140  |  107  |  44<H>  ----------------------------<  148<H>  3.9   |  26  |  2.31<H>  12-29    142  |  108  |  41<H>  ----------------------------<  107<H>  3.6   |  26  |  2.18<H>    Ca    9.1      30 Dec 2019 07:51  Ca    8.9      29 Dec 2019 06:31  Phos  4.1     12-30  Phos  3.3     12-29  Mg     2.1     12-30  Mg     2.3     12-29    TPro  6.9  /  Alb  2.8<L>  /  TBili  0.3  /  DBili  x   /  AST  10  /  ALT  13  /  AlkPhos  69  12-30  TPro  6.7  /  Alb  2.7<L>  /  TBili  0.4  /  DBili  x   /  AST  11  /  ALT  13  /  AlkPhos  61  12-29          URINE STUDIES:        Osmolality, Random Urine: 271 mos/kg (12-27 @ 05:50)  Sodium, Random Urine: 63 mmol/L (12-27 @ 05:50)  Chloride, Random Urine: 56 mmol/L (12-27 @ 05:50)                  RADIOLOGY & ADDITIONAL STUDIES: pt seen,  examined case discussed with renal attending    SUBJECTIVE:  pt feels fine and denies cp,sob,gi or gu/uremic  symptoms    REVIEW OF SYSTEMS:  CONSTITUTIONAL: No weakness, fevers or chills  EYES/ENT: No visual changes;  No vertigo or throat pain   NECK: No pain or stiffness  RESPIRATORY: No cough, wheezing, hemoptysis; No shortness of breath  CARDIOVASCULAR: No chest pain or palpitations  GASTROINTESTINAL: No abdominal or epigastric pain. No nausea, vomiting, or hematemesis; No diarrhea or constipation. No melena or hematochezia.  GENITOURINARY: No dysuria, frequency , hematuria, flank pain or nocturia  NEUROLOGICAL: No numbness or weakness  SKIN: No itching, burning, rashes, or lesions   All other review of systems is negative unless indicated above    Current meds:    amLODIPine   Tablet 10 milliGRAM(s) Oral daily  carvedilol 12.5 milliGRAM(s) Oral every 12 hours  DULoxetine 60 milliGRAM(s) Oral daily  enoxaparin Injectable 30 milliGRAM(s) SubCutaneous daily  FLUoxetine 20 milliGRAM(s) Oral at bedtime  insulin lispro (HumaLOG) corrective regimen sliding scale   SubCutaneous Before meals and at bedtime  OLANZapine 10 milliGRAM(s) Oral daily  ondansetron    Tablet 8 milliGRAM(s) Oral three times a day PRN  simvastatin 20 milliGRAM(s) Oral at bedtime  sodium chloride 0.9%. 1000 milliLiter(s) IV Continuous <Continuous>  sodium chloride 0.9%. 1000 milliLiter(s) IV Continuous <Continuous>  traZODone 50 milliGRAM(s) Oral daily      Vital Signs    T(F): 97.8 (12-31-19 @ 08:08), Max: 98.2 (12-31-19 @ 00:31)  HR: 77 (12-31-19 @ 08:08) (69 - 81)  BP: 161/71 (12-31-19 @ 08:08) (142/71 - 168/78)  ABP: --  RR: 17 (12-31-19 @ 08:08) (16 - 17)  SpO2: 100% (12-31-19 @ 08:08) (99% - 100%)  Wt(kg): --  CVP(cm H2O): --  CO: --  PCWP: --    I and O's:    Daily     Daily     PHYSICAL EXAM:  Constitutional: well developed, well nourished  and in nad  HEENT: PERRLA,  no icteric sclera and mild pallor of conjunctiva noted  Neck: No JVD, thyromegaly or adenopathy  Respiratory: reduced air entry lower lungs with no rales, wheezing or rhonchi  Cardiovascular: S1 and S2 normally heard  Gastrointestinal: soft, nondistended, nontender and normal bowel sounds heard  Extremities: No peripheral edema or cyanosis  Neurological: A/O x 3, no focal deficits  : No flank or cva tenderness palpated.  Skin: No rashes      LABS:    CBC:                          8.4    5.00  )-----------( 278      ( 30 Dec 2019 07:51 )             27.3           BMP:    12-30    140  |  107  |  44<H>  ----------------------------<  148<H>  3.9   |  26  |  2.31<H>  12-29    142  |  108  |  41<H>  ----------------------------<  107<H>  3.6   |  26  |  2.18<H>    Ca    9.1      30 Dec 2019 07:51  Ca    8.9      29 Dec 2019 06:31  Phos  4.1     12-30  Phos  3.3     12-29  Mg     2.1     12-30  Mg     2.3     12-29    TPro  6.9  /  Alb  2.8<L>  /  TBili  0.3  /  DBili  x   /  AST  10  /  ALT  13  /  AlkPhos  69  12-30  TPro  6.7  /  Alb  2.7<L>  /  TBili  0.4  /  DBili  x   /  AST  11  /  ALT  13  /  AlkPhos  61  12-29          URINE STUDIES:        Osmolality, Random Urine: 271 mos/kg (12-27 @ 05:50)  Sodium, Random Urine: 63 mmol/L (12-27 @ 05:50)  Chloride, Random Urine: 56 mmol/L (12-27 @ 05:50)

## 2019-12-31 NOTE — DISCHARGE NOTE NURSING/CASE MANAGEMENT/SOCIAL WORK - PATIENT PORTAL LINK FT
You can access the FollowMyHealth Patient Portal offered by [facility] by registering at the following website: [email or web address]. By joining [facility]’s FollowMyHealth portal, you will also be able to view your health information using other applications (apps) compatible with our system.

## 2019-12-31 NOTE — PROGRESS NOTE ADULT - ASSESSMENT
ASSESSMENT AND PLAN:     1. YASMINE secondary to volume depletion improving  2. CKD, DM/HTN related kidney disease  3. Hypokalemia resolved  4. HTN is controlled  Keep patient euvolemic and renal diet  Avoid Nephrtoxic Meds/ Agents such as NSAIDs, Gadolinium contrast, Phosphate containing enemas, etc..)  Adjust Medications according to eGFR  Follow BMP, H/H ASSESSMENT AND PLAN:     1. YASMINE secondary to volume depletion improving  2. CKD stage 3, DM/HTN related kidney disease  3. Hypokalemia resolved  4. HTN is controlled  Keep patient euvolemic and renal diet  Avoid Nephrtoxic Meds/ Agents such as NSAIDs, Gadolinium contrast, Phosphate containing enemas, etc..)  Adjust Medications according to eGFR  Follow BMP, H/H ASSESSMENT AND PLAN:     1. YASMINE secondary to volume depletion improving  2. CKD stage 3, DM/HTN related kidney disease  3. Hypokalemia resolved  4. HTN is controlled  Keep patient euvolemic and renal diet  Avoid Nephrtoxic Meds/ Agents such as NSAIDs, Gadolinium contrast, Phosphate containing enemas, etc..)  Adjust Medications according to eGFR  Follow BMP, H/H  Pt adviced to follow up with nephrologist as out-patient

## 2020-01-03 LAB — PYRIDOXAL PHOS SERPL-MCNC: 6 UG/L — SIGNIFICANT CHANGE UP (ref 2–32.8)

## 2020-08-10 NOTE — ED ADULT NURSE NOTE - ISOLATION TYPE:
Recommendations:      Medications: Continue Concerta 36 mg daily. Discussed resuming Concerta 54 mg for test days once school resumes in the fall. Dosing, administration, and side effects reviewed.     I reviewed protocol for this medication, and sent refill request to the prescribing provider.      None

## 2021-03-06 NOTE — PATIENT PROFILE ADULT - BRADEN SENSORY
-secondary to aspiration PNA  -currently on RA, continue BIPAP at night  -completed ABT   ID Dr. Sosa (4) no impairment

## 2022-06-08 NOTE — PHYSICAL THERAPY INITIAL EVALUATION ADULT - MANUAL MUSCLE TESTING RESULTS, REHAB EVAL
Head,  normocephalic,  atraumatic,  Face,  Face within normal limits,  Ears,  External ears within normal limits,  Nose/Nasopharynx,  External nose  normal appearance (B)UE/LE grossly 4-/5 except for right ankle 3/5